# Patient Record
Sex: MALE | Race: WHITE | NOT HISPANIC OR LATINO | Employment: FULL TIME | URBAN - METROPOLITAN AREA
[De-identification: names, ages, dates, MRNs, and addresses within clinical notes are randomized per-mention and may not be internally consistent; named-entity substitution may affect disease eponyms.]

---

## 2017-07-19 ENCOUNTER — TRANSFERRED RECORDS (OUTPATIENT)
Dept: HEALTH INFORMATION MANAGEMENT | Facility: CLINIC | Age: 22
End: 2017-07-19

## 2017-09-13 ENCOUNTER — OFFICE VISIT - HEALTHEAST (OUTPATIENT)
Dept: FAMILY MEDICINE | Facility: CLINIC | Age: 22
End: 2017-09-13

## 2017-09-13 DIAGNOSIS — M54.9 BACK PAIN: ICD-10-CM

## 2017-09-13 ASSESSMENT — MIFFLIN-ST. JEOR: SCORE: 2320.95

## 2017-09-26 ENCOUNTER — OFFICE VISIT - HEALTHEAST (OUTPATIENT)
Dept: FAMILY MEDICINE | Facility: CLINIC | Age: 22
End: 2017-09-26

## 2017-09-26 DIAGNOSIS — F41.9 ANXIETY: ICD-10-CM

## 2017-09-26 ASSESSMENT — MIFFLIN-ST. JEOR: SCORE: 2327.76

## 2017-10-13 ENCOUNTER — COMMUNICATION - HEALTHEAST (OUTPATIENT)
Dept: FAMILY MEDICINE | Facility: CLINIC | Age: 22
End: 2017-10-13

## 2017-11-22 ENCOUNTER — OFFICE VISIT - HEALTHEAST (OUTPATIENT)
Dept: FAMILY MEDICINE | Facility: CLINIC | Age: 22
End: 2017-11-22

## 2017-11-22 DIAGNOSIS — Z78.9 MALE-TO-FEMALE TRANSGENDER PERSON: ICD-10-CM

## 2017-11-22 DIAGNOSIS — F32.A DEPRESSION: ICD-10-CM

## 2017-11-22 ASSESSMENT — MIFFLIN-ST. JEOR: SCORE: 2299.7

## 2017-11-30 ENCOUNTER — COMMUNICATION - HEALTHEAST (OUTPATIENT)
Dept: FAMILY MEDICINE | Facility: CLINIC | Age: 22
End: 2017-11-30

## 2017-12-14 ENCOUNTER — TELEPHONE (OUTPATIENT)
Dept: OTHER | Facility: OUTPATIENT CENTER | Age: 22
End: 2017-12-14

## 2017-12-14 NOTE — TELEPHONE ENCOUNTER
Telephone Intake TG Adult  Date: 2017  Client Name:  Edgard Tran  Preferred Name: Tamika    MRN: [id number]  : 1995      Age:  22  Caller Name: Tamika  Relationship to Client:      Presenting Problem / Reason for Assessment   (Clinical History &Symptoms):     HRT - MTF  Reduce the symptoms of severe dysphoria  Family Support - Feels that is ok - They are working on understanding  Willing to see a Gender Therapist is recommended        Clarify their goals/expected services from  medical care what are they looking for?    Clarify with patient (as necessary) that we are not a primary care clinic and that we do not have a surgeon. We strongly recommend that patients have a primary care doctor for their overall health needs, and we can refer to primary care clinics. We can assist with surgery referrals.  Not currently      Length of time experiencing symptoms:  Since puberty - age 11                                                                                Now has understanding of the feelings they have been having all this time      Seen Other Providers for Gender (if so, where):    M.D/other.(hormones) :  No  --If yes, request records from the provider at the 1st session.    Therapist:  [facility]                      [place]  --if yes, request a referral or summary letter from the therapist at the 1st session..    Psychiatrist:  No  --if yes,, request records from the provider at the 1st session..      Other Medical/Mental Health Diagnoses:  Gender Dysphoria                                                                                ADHD                                                                                Disassociation                                                                                General Anxiety Disorder                                                                                Depression          If needed, clarify if patient calling from group home or  other supervised living arrangement    Note if patient has no mental health or cognitive diagnosis, but phone behavior suggests impairment      Medications:  Sertraline 100 mg      Primary Care Provider: [facility]                    --If multiple medical conditions, request recent records from primary doctor at the 1st session..      Referral Source:  Primary Clinic      Follow Up:        Please Verify Registration    If patient has [employer] Form or [employer], send to .     Prep Welcome Packet and have patient come half hour beforehand to fill out forms in packet (health history form, transgender history, Safety Screening, PHQ9, and JOSE ANTONIO-7.     Paperwork sent 12/15  Appt 1/29/2018

## 2018-01-29 ENCOUNTER — OFFICE VISIT (OUTPATIENT)
Dept: OTHER | Facility: OUTPATIENT CENTER | Age: 23
End: 2018-01-29
Payer: COMMERCIAL

## 2018-01-29 VITALS — WEIGHT: 274 LBS | HEIGHT: 74 IN | BODY MASS INDEX: 35.16 KG/M2

## 2018-01-29 DIAGNOSIS — F33.2 SEVERE EPISODE OF RECURRENT MAJOR DEPRESSIVE DISORDER, WITHOUT PSYCHOTIC FEATURES (H): ICD-10-CM

## 2018-01-29 DIAGNOSIS — F12.10 MARIJUANA ABUSE: ICD-10-CM

## 2018-01-29 DIAGNOSIS — F41.8 OTHER SPECIFIED ANXIETY DISORDERS: ICD-10-CM

## 2018-01-29 DIAGNOSIS — F64.0 GENDER DYSPHORIA IN ADOLESCENT AND ADULT: Primary | ICD-10-CM

## 2018-01-29 ASSESSMENT — ANXIETY QUESTIONNAIRES
3. WORRYING TOO MUCH ABOUT DIFFERENT THINGS: NEARLY EVERY DAY
7. FEELING AFRAID AS IF SOMETHING AWFUL MIGHT HAPPEN: NEARLY EVERY DAY
5. BEING SO RESTLESS THAT IT IS HARD TO SIT STILL: NEARLY EVERY DAY
GAD7 TOTAL SCORE: 20
6. BECOMING EASILY ANNOYED OR IRRITABLE: MORE THAN HALF THE DAYS
2. NOT BEING ABLE TO STOP OR CONTROL WORRYING: NEARLY EVERY DAY
1. FEELING NERVOUS, ANXIOUS, OR ON EDGE: NEARLY EVERY DAY

## 2018-01-29 ASSESSMENT — PATIENT HEALTH QUESTIONNAIRE - PHQ9: 5. POOR APPETITE OR OVEREATING: NEARLY EVERY DAY

## 2018-01-29 NOTE — NURSING NOTE
"Chief Complaint   Patient presents with     Consult     TG       Vitals:    01/29/18 0854   Weight: 124.3 kg (274 lb)   Height: 1.873 m (6' 1.75\")       Body mass index is 35.42 kg/(m^2).      Yi Castellano, [facility]  Sindy Uriostegui  "

## 2018-01-29 NOTE — MR AVS SNAPSHOT
After Visit Summary   1/29/2018    Edgard Tran    MRN: [id number]           Patient Information     Date Of Birth          1995        Visit Information        Provider Department      1/29/2018 9:00 AM [facility]        Today's Diagnoses     Gender dysphoria in adolescent and adult    -  1    Severe episode of recurrent major depressive disorder, without psychotic features (H)        Other specified anxiety disorders        Marijuana abuse           Follow-ups after your visit        Your next 10 appointments already scheduled     Feb 23, 2018  9:00 AM CST   Diagnostic Evaluation with Yvette Myrick PsyD   [facility] ([facility])    [address]  [address] 75[address]  [facility] [address]   [phone]            Mar 14, 2018  9:00 AM CDT   INDIVIDUAL THERAPY with Yvette Myrick PsyD   [facility] ([facility])    [address]  [address]  [facility] [address]   [phone]            Mar 28, 2018  9:00 AM CDT   INDIVIDUAL THERAPY with Yvette Myrick PsyD   [facility] ([facility])    [address]  [address]  [facility] [address]   [phone]              Who to contact     Please call your clinic at [phone] to:    Ask questions about your health    Make or cancel appointments    Discuss your medicines    Learn about your test results    Speak to your doctor            Additional Information About Your Visit        MyChart Information     [employer] is an electronic gateway that provides easy, online access to your medical records. With [employer], you can request a clinic appointment, read your test results, renew a prescription or communicate with your care team.     To sign up for [employer] visit the website at [email or web address]   You will be asked to enter the access code listed below, as well as  "some personal information. Please follow the directions to create your username and password.     Your access code is: [email or web address]  Expires: 2018  1:55 PM     Your access code will  in 90 days. If you need help or a new code, please contact your [facility] or call [phone] for assistance.        Care EveryWhere ID     This is your Care EveryWhere ID. This could be used by other organizations to access your [place] medical records  [id number]        Your Vitals Were     Height BMI (Body Mass Index)                1.873 m (6' 1.75\") 35.42 kg/m2           Blood Pressure from Last 3 Encounters:   No data found for BP    Weight from Last 3 Encounters:   18 124.3 kg (274 lb)              Today, you had the following     No orders found for display       Primary Care Provider    None Specified       No primary provider on file.        Equal Access to Services     [facility]: Hadii elsa Villarreal, wachristiane boles, simón chandraalmadavey ramos, aram waldron . So [facility] [phone].    ATENCIÓN: Si habla español, tiene a wells disposición servicios gratuitos de asistencia lingüística. Llame al [phone].    We comply with applicable federal civil rights laws and [place] laws. We do not discriminate on the basis of race, color, national origin, age, disability, sex, sexual orientation, or gender identity.            Thank you!     Thank you for choosing [facility]  for your care. Our goal is always to provide you with excellent care. Hearing back from our patients is one way we can continue to improve our services. Please take a few minutes to complete the written survey that you may receive in the mail after your visit with us. Thank you!             Your Updated Medication List - Protect others around you: Learn how to safely use, store and throw away your medicines at www.disposemymeds.org.          This list is " accurate as of 1/29/18 11:59 PM.  Always use your most recent med list.                   Brand Name Dispense Instructions for use Diagnosis    SERTRALINE HCL PO      Take 150 mg by mouth daily

## 2018-01-29 NOTE — PROGRESS NOTES
" Transgender Diagnostic and Assessment    Preferred name: Tamika  Preferred pronouns: she/her  Sex assigned at birth:male  Gender identity: trans woman        PAST GENDER THERAPY: none  PAST HORMONE USE: none  GENDER RELATED SURGERIES: none    INTRODUCTION and GOALS    22 year old trans woman presenting to feminize body    GENDER DEVELOPMENT  Patient states she put a name to her gender issues just less than 6 months ago.  As a child she noticed she was attracted to girls clothes, but has a sense of guilt and never did anything with this attraction. She recalls stealing her mother's lipstick at around age 6-7 and recalls her mother yelling at her. Around the age of sixth grade, she states was \"alienated\" from her body and was also alienated from social interaction; felt she could not relate to people, although she could perhaps more toward girls. She received social messages that she was supposed to hang out with boys; she would go through periods of wearing her sister's and mother's clothes in private-both underwear and outerwear. She did grow her hair at shoulder length before her mother made her cut it. Her father was present in her life during middle school, but was hands off in terms of how she dressed or chose to present herself to the world; would make fun of her for her long hair. Teachers were accepting of her dress and mannerisms.   Patient self-describes her symptoms during this time as dissociative. In high school she describes having a lot of confusion, and struggled academically both in middle school and high school. She tried to fit in socially and experienced further alienation.  She states she started smoking marijuana at age 15, stopped caring about what other people thought, and started explore her sexuality.  Specifically, began accepting and describing her sexuality as queer, and came out as queer to her mother. Mother then told patient to pick one or the other: donaldson or straight. Pt chose to label " "herself as donaldson and then felt shame at being attracted to women in addition to men. Pt saw a several general therapists, spoke to them about her sexuality, specifically; in recollection states did not know trans people outside of jokes and pornography.  She never talked about it at this point.  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 Two years ago met a transwoman as a friend and she talked to the patient about it; patient started recognizing transgender issues in herself but rejected them.  She began consuming significant amounts of marijuana and LSD, became heavily depressed and \"disassociative.\" Just this past summer around 08/2017, she read an article on the Internet about gender dysphoria.  She became very emotional, and had an inspirational or exploratory moment; her dysphoria ramped up and she felt she had to accept things as they are. She presented here for care instead of continuing to smoke marijuana.           BODY,  ANATOMIC CONCERNS  She describes puberty as starting at around age 12-13; describes herself as feeling deadened emotionally; she would become silent and stare off into space.  Prior to this, she was a hyperactive, talkative child.  Currently, experiences most distress about her height, shoulders, feet and hips.         DISCLOSURE and RELATIONSHIP, SOCIAL IMPACTS  She currently lives with her parents, who are together. Mother knows about her gender and plans for medical transition. She reacted negatively up until just last week when she went to a therapy session with the patient. She is now more accepting.  Father also knows about patient's gender and plans for medical transition, and is described by pt as worrying about her gender and transition plans.  Other siblings live in [place] and are supportive, and online friends are supportive.  She has some physical friends who are " "supportive.  She works full-time, is not out at work and has no plans to come out at work.         CURRENT SOCIAL, LEGAL OR PHYSICAL TRANSITIONS    She has not legally transitioned. She has come out to a few physical friends and online friends.  She dresses in feminine attire at home, but not in public.  She uses feminine name and pronouns at home and with friends.       CURRENT SOCIAL SUPPORTS, ECONOMIC AND HEALTH CARE CONCERNS  She relies on her friends for emotional support, her parents for logistical support such as insurance and housing.  She describes her transportation and financial situation as both stable and available.  She does have a primary care provider.         TRANSGENDER SUPPORTS/ TRANSPHOBIA  She has met one transwoman but currently does not have any other transpeople in her life, face-to-face. But she does interact online.  She is familiar with local trans resources, but has not accessed them.         PSYCHIATRIC HISTORY  Pt has an extensive psychiatric history. She was diagnosed with depression at age 11 and also with generalized anxiety disorder.  She has a history of a bipolar diagnosis, but only saw that provider for about 10 minutes.  She describes having symptoms of disassociation in the past, but no formal diagnosis of a dissociative disorder. In 2014 she was hospitalized at [facility] due to a sense of \"disconnection from reality,\" and self-harming behavior.  She was cutting her arms.  She has a history of self-harm which started in 9th grade-mainly cutting on the left arm.  This was more prevalent when she was younger but the behavior continues, with last episode 4 months ago.  Currently, she has just started on Zoloft in 10/2017, under the care of her primary care physician.  She was last seen 1 month ago. She last saw a psychiatrist over 4 years ago; past medications include Concerta for ADHD, Wellbutrin and \"a pill for bipolar disorder.\"      SOCIAL HISTORY:  Alcohol use once a week, 1 " "drink per sitting.  However, she has daily use of marijuana 1-2 times a day.  This is reduced from high school when she used 4-6 times a day.  She continues to use marijuana to control her depression, mood swings.  She has no history of other substance use or eating disorders.  She describes obsessive thoughts and intrusive visualizations.  Cage questionnaire is 4/4; PHQ 9 is 26, with positive suicidal ideation without plan.  [place] 7 is 19. Patient describes not wanting to kill herself but having a general sense of wanting to die on a regular basis.       PE  Height 1.873 m (6' 1.75\"), weight 124.3 kg (274 lb).        Mental Status Assessment:  Appearance:  Adequately groomed; significant marijuana smell surrounds patient  Behavior/relationship to examiner/demeanor:  Cooperative and Reduced eye contact  Orientation: Oriented to person, place and time  Speech Rate:  Normal  Speech Spontaneity:  Normal  Mood:  dysphoric and anxious  Affect:  Restricted and Anxious/Nervous  Thought Process (Associations):  Logical, Linear and Goal directed  Thought Content:  patient does not appear to be responding to internal stimuli, Suicidal ideation, denies suicidal intent or plan and Obsessions  Abnormal Perception:  None  Attention/Concentration:  Fair  Language:  Intact  Insight:  Fair  Judgment:  Fair    Motor activity/EPS:  Agitation, mild    A/P  1. Gender dysphoria  2. Major depression, severe  3. Anxiety disorder  4. Marijuana use     Counseled patient on the following issues:      Patient meets the guideline criteria for gender dysphoria as outlined in DSM-5 and WPATH SOC 7, and may benefit from from hormone therapy.     They have do not appear to have stable understanding of their gender identity and interventions for gender dysphoria, as have only been consciously exploring gender since 8/2017.   --Prior to any hormone therapy, would require patient to establish care with a gender therapist to further explore gender,  " navigating social interactions, and the range of medical and non medical interventions for dysphoria    As per WPATH SOC 7 guidelines, mental health issues need to be reasonably well controlled.  Tamika has significant uncontrolled mental health issues.   --Prior to any hormone therapy., would require reasonable control (at least partial remission) of psychiatric conditions. Pt to see primary care provider for this to start, but may require psychiatrist. Reduce marijuana use to 1-2x/week, discussed not using to self-medicate    They have partially engaged in  appropriate social transitions and any associated disclosures of their gender status, reducing risk of psychosocial harms. Tamika is dependent on parents for financial, insurance and housing and is unclear how supportive parents are of medical transition. She is not out at work and should work with gender therapist to develop a plan to address this issue as well    They have identified an emotional and logistical support system to assist them with challenges commonly associated with medical and social transition.    They have some  knowledge of transgender peer/community resources and have transgender peer support.     There are no significant financial, insurance, transportation or housing barriers to safe, effective hormone therapy at this time, except as these are largely  based on parental support     Patient to schedule medical evauation and informed consent appointment when the above issues are addressed.     I spent a total of 50 minutes face-to-face with Edgard Tran during today's office visit.  Over 50% of this time was spent counseling the patient and/or coordinating care regarding aove.  See note for details.

## 2018-01-30 ASSESSMENT — ANXIETY QUESTIONNAIRES: GAD7 TOTAL SCORE: 20

## 2018-01-30 ASSESSMENT — PATIENT HEALTH QUESTIONNAIRE - PHQ9: SUM OF ALL RESPONSES TO PHQ QUESTIONS 1-9: 26

## 2018-02-09 PROBLEM — F41.1 GENERALIZED ANXIETY DISORDER: Status: ACTIVE | Noted: 2018-02-09

## 2018-02-09 PROBLEM — F41.8 OTHER SPECIFIED ANXIETY DISORDERS: Status: ACTIVE | Noted: 2018-02-09

## 2018-02-09 PROBLEM — F12.10 MARIJUANA ABUSE: Status: ACTIVE | Noted: 2018-02-09

## 2018-02-09 PROBLEM — F64.0 GENDER DYSPHORIA IN ADOLESCENT AND ADULT: Status: ACTIVE | Noted: 2018-02-09

## 2018-02-09 PROBLEM — F32.9 MAJOR DEPRESSIVE DISORDER WITHOUT PSYCHOTIC FEATURES: Status: ACTIVE | Noted: 2018-02-09

## 2018-02-23 ENCOUNTER — OFFICE VISIT (OUTPATIENT)
Dept: OTHER | Facility: OUTPATIENT CENTER | Age: 23
End: 2018-02-23
Payer: COMMERCIAL

## 2018-02-23 DIAGNOSIS — F64.0 GENDER DYSPHORIA IN ADOLESCENT AND ADULT: Primary | ICD-10-CM

## 2018-02-23 DIAGNOSIS — F33.1 MAJOR DEPRESSIVE DISORDER, RECURRENT EPISODE, MODERATE (H): ICD-10-CM

## 2018-02-23 DIAGNOSIS — F41.1 GAD (GENERALIZED ANXIETY DISORDER): ICD-10-CM

## 2018-02-23 ASSESSMENT — PATIENT HEALTH QUESTIONNAIRE - PHQ9: 5. POOR APPETITE OR OVEREATING: NEARLY EVERY DAY

## 2018-02-23 ASSESSMENT — ANXIETY QUESTIONNAIRES
2. NOT BEING ABLE TO STOP OR CONTROL WORRYING: NEARLY EVERY DAY
3. WORRYING TOO MUCH ABOUT DIFFERENT THINGS: NEARLY EVERY DAY
5. BEING SO RESTLESS THAT IT IS HARD TO SIT STILL: NEARLY EVERY DAY
1. FEELING NERVOUS, ANXIOUS, OR ON EDGE: NEARLY EVERY DAY
GAD7 TOTAL SCORE: 19
6. BECOMING EASILY ANNOYED OR IRRITABLE: SEVERAL DAYS
7. FEELING AFRAID AS IF SOMETHING AWFUL MIGHT HAPPEN: NEARLY EVERY DAY

## 2018-02-23 NOTE — MR AVS SNAPSHOT
After Visit Summary   2018    Edgard Tran    MRN: [id number]           Patient Information     Date Of Birth          1995        Visit Information        Provider Department      2018 9:00 AM Yvette Myrick PsyD [facility]        Today's Diagnoses     Gender dysphoria in adolescent and adult    -  1    Major depressive disorder, recurrent episode, moderate (H)        JOSE ANTONIO (generalized anxiety disorder)           Follow-ups after your visit        Your next 10 appointments already scheduled     Mar 14, 2018  9:00 AM CDT   Individual Therapy 53+ minutes with Yvette Myrick PsyD   [facility] ([facility])    [address]  [address] 7521  [facility] [address]   [phone]            Mar 28, 2018  9:00 AM CDT   Individual Therapy 53+ minutes with Yvette Myrick PsyD   [facility] ([facility])    [address]  [address]  [facility] [address]   [phone]              Who to contact     Please call your clinic at [phone] to:    Ask questions about your health    Make or cancel appointments    Discuss your medicines    Learn about your test results    Speak to your doctor            Additional Information About Your Visit        MyChart Information     [employer] is an electronic gateway that provides easy, online access to your medical records. With [employer], you can request a clinic appointment, read your test results, renew a prescription or communicate with your care team.     To sign up for [employer] visit the website at [email or web address]   You will be asked to enter the access code listed below, as well as some personal information. Please follow the directions to create your username and password.     Your access code is: [email or web address]  Expires: 2018  1:55 PM     Your access code will  in 90 days. If you need help or a new code, please  contact your [facility] or call [phone] for assistance.        Care EveryWhere ID     This is your Care EveryWhere ID. This could be used by other organizations to access your [place] medical records  [id number]         Blood Pressure from Last 3 Encounters:   No data found for BP    Weight from Last 3 Encounters:   01/29/18 124.3 kg (274 lb)              We Performed the Following     Diagnostic Assessment (complete) [29208]        Primary Care Provider    None Specified       No primary provider on file.        Equal Access to Services     AVELINO LAWSON : Hadii aad ku hadasho Soomaali, waaxda luqadaha, qaybta kaalmada adeegyada, waxay idiin hayaan adeeg chery waldron . So [facility] [phone].    ATENCIÓN: Si habla español, tiene a wells disposición servicios gratuitos de asistencia lingüística. [facility] [phone].    We comply with applicable federal civil rights laws and [place] laws. We do not discriminate on the basis of race, color, national origin, age, disability, sex, sexual orientation, or gender identity.            Thank you!     Thank you for choosing [facility]  for your care. Our goal is always to provide you with excellent care. Hearing back from our patients is one way we can continue to improve our services. Please take a few minutes to complete the written survey that you may receive in the mail after your visit with us. Thank you!             Your Updated Medication List - Protect others around you: Learn how to safely use, store and throw away your medicines at www.disposemymeds.org.          This list is accurate as of 2/23/18 11:59 PM.  Always use your most recent med list.                   Brand Name Dispense Instructions for use Diagnosis    SERTRALINE HCL PO      Take 150 mg by mouth daily

## 2018-02-23 NOTE — PROGRESS NOTES
[facility]  Program in Human Sexuality  Department of Family Medicine & Community Health  [employer] [facility] [employer]  [address]  [place] [address]  Phone: [phone]  Fax: [phone]  [email or web address]    Transgender Diagnostic (TG) Diagnostic Assessment Interview  It is not required that you ask all questions or problems. Prioritize and set the most clinically relevant information in the time available.    Date of Service: 2/23/18  Client Name: Edgard Tran  YOB: 1995  22 year old  MRN:  [id number]  Gender/Gender Identity: transgender woman  Treating Provider:   Program: TG  Type of Session: Assessment  Present in Session: client  Number of Minutes:  55         Ethnicity:    _X   __   __Latino/  __Native American        __Asian American  __ Multi-racial: __ Other (Describe): _______________________________________________________________    Any relevant cultural issues? (Minority stress, immigration history, level of acculturation, social orientation, time orientation, verbal communication style, spiritual beliefs, etc.) _____________________________________________ ________________________________________________________________________________________          Instructions for therapists: X Introduce yourself; X Description of process; X Testing and checklist packets (for couple give each an appropriate packet); X Review client confidentiality    Individuals present at session (other than patient): none    Please Note: Client identified name  Tamika  and stated preference for she/her/hers pronouns. Therefore, she/her/hers pronouns will be used throughout this document when referring to client.    Referral Source: Primary care doctor Dr. Uriostegui at [facility].    Chief Complaint/Presenting Problem and Goals:  Client reported a long-standing gender dysphoria since adolescence. Client noted goal of  getting support for HRT; noting that she saw Wesley Mg MD for a consultation and Dr. Mg referred client to therapy.    _________________________________________________________________________  Transgender Health Services  Program-Specific Information    History of gender dysphoria   Client reported that she first discovered the transgender community and the idea of gender diversity at age 18 through social media. Client noted reading an article about gender dysphoria and dissociation; noting recognizing her own dissociation. Client recalled a significant shift in her personality and mood as an adolescent. Client stated that as a child, she was  loud  and outgoing, which shifted to socially isolating, internalizing, and dissociating from her body in adolescence. Client described dissociating as a feeling of  dreaming when I am awake.  Client noted retrospectively recognizing that this shift was the result of unintentionally suppressing her gender identity as female.    Client noted that as a child (age 5) she explored her mother s make-up and dresses, which she was reprimanded for. Client noted that once in a while following her initial efforts, she would dress in private and feel  intense shame.  Client described reading and video games as her escape. Client noted that she was  constantly  reading from the time she could read (age 4-5). Client noted that she was reading at college level at age 7.     Client reported that she disclosed her gender identity to one close friend and to her therapist. Client reported that her parents have not been supportive of her desire to transition.    Body Image/Anatomical dysphoria:  Client noted that she  hates  her body. Client noted body dysphoria in reaction to her body shape, voice, shoulders, facial/body hair, and chest. Client reported that at the time of puberty, she did not recognize the source of distress. Looking back, she recognizes her emotional and social  deterioration at the onset of puberty as an indicator of her gender dysphoria.    Social Supports:   Client reported that she has few friends and minimal social support. Client shared history of struggles establishing and maintaining friendships.    Internalized transphobia:  Client noted struggles to transition fully for fear of social rejection.    Relevant Sexuality Information:  Client reported history of one romantic relationship that lasted for 1 year.    _________________________________________________________________________      Mental Health History   Current Symptoms: Client reported moderate-severe depression symptoms and severe anxiety (see PHQ-9 and JOSE ANTONIO-7).    Therapist:  (past 4 years) [facility], [place]; depression, gender identity, social issues    Hospitalization: (2015) [facility] - hospitalized for  self-harm  and safety concerns.        Other Medical/Mental Health Diagnoses:  Gender Dysphoria, ADHD, Disassociation, General Anxiety Disorder, Depression          Medications:  Sertraline 100 mg    Prescriber: RINA [facility]    Substance Use: Client reported consuming 1 alcoholic beverage per week on average. Client denied additional substance use.    Medical History     Primary Care Provider: [facility]                              Current Health: Overweight  General medications: none  Major accidents/illnesses/surgeries:  none        Medications: none     Prescribing Physician: none     Diagnosis (if known): none      Relationships/Social History:  Client reported a long standing history of struggles establishing meaningful relationships and social isolation. Client reported one long-term (1 year) romantic relationship with a cisgender female.    Family History  Client was born and raised in [place] to an intact family. Client is the younger of 2 children (older sister age 27).Client described her relationship with her parents as  good but distant.  Client  described her relationship with her brother as  good.  Client denied history of abuse. Client currently lives with her parents in their family home in [place], [place].      Educational History Client earned his high school diploma from [employer].    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 Client has been in current position for 4 years.     Legal Issues: none reported    _________________________________________________________________________     CONCLUSIONS      Mental Status   Appearance:  No apparent distress  Behavior/relationship to examiner/demeanor:  Cooperative and Resistant  Orientation: Oriented to person, place, time and situation  Speech Rate:  Normal  Speech Spontaneity:  Normal  Mood:  dysphoric and anxious  Affect:  Tearful and Restricted  Thought Process (Associations):  Logical  Thought Content:  no evidence of suicidal or homicidal ideation  Abnormal Perception:  None  Attention/Concentration:  Normal  Language:  Intact  Insight:  Adequate  Judgment:  Fair      DSM-5 Diagnosis:  302.85 Gender Dysphoria in Adult  296.32 Major Depressive Disorder, Recurrent, moderate  300.02 Generalized Anxiety Disorder  R/O Autism      Conclusions/Recommendations/Initial Treatment Goals:   Client is a , 22 year old person assigned male at birth who is questioning their gender identity. They reported a history of gender non-conforming behavior and interests that emerged in childhood. Client described experiencing depression and anxiety symptoms in reaction to repression and suppression of their gender identity. Based on client s history and account of symptoms, client meets the criteria for gender dysphoria which includes cross-gender identification, anatomical dysphoria, notable discomfort with birth assigned sex and the cultural expectations of the traditional male gender role. Client reported seeking  psychotherapy services at this time for support of social and medical gender transition. Complicating her situation are: 1) current struggles with severe depression and anxiety; and 2) waivering family support regarding her gender identity.  Given client s presenting concerns and goals for therapy, the following recommendations are offered:  1) Continue individual therapy with a provider specialized in gender and sexuality. Therapy should focus exploring the role of client s gender nonconformity, body dysphoria, sexual preferences, and social pattern in client s emotional and relational functioning. Therapy should also serve as a safe place for client to further explore her gender and sexual identity.  2) Continue to assess client s mental health and emotional well-being, as mental health stability will be an important prior to taking steps towards medical interventions, as recommended by the WPATH Standards of Care. Therapy should focus on exploring client s expectations for potential medical interventions as they relate to reality.   3) Consider participation in transgender support group in order to develop increased social support regarding transition goals.   4) Client reported long-standing social struggles, hyperfocused interests beginning in childhood, accelerated reading ability and comprehension (splitter skills), and preference for fantasy based social interaction to face-to-face, and history of ADHD. Given the high co-occurrence of neurodiversity (i.e., Autism Spectrum Disorder (ASD) and Attention Deficit Hyperactivity Disorder (ADHD)) and gender identity related concerns, coupled with client s presenting social and behavioral patterns, it is recommended that client undergo a more specialized assessment aimed at assessing client for potential Autism Spectrum Disorder. Increased understanding of client s neurofunctioning could provide a greater sense of empowerment for client and more individualized  approach to care by each of client s providers.  5) Continue psychiatric intervention through primary care doctor for stability of mood and/or consider psychiatric consultation with a mental health practitioner, as more specialized care could provide client with the best options of intervention.  Yvette Myrick PsyD, LP

## 2018-03-08 PROBLEM — F33.1 MAJOR DEPRESSIVE DISORDER, RECURRENT EPISODE, MODERATE (H): Status: ACTIVE | Noted: 2018-03-08

## 2018-03-14 ENCOUNTER — TELEPHONE (OUTPATIENT)
Dept: OTHER | Facility: OUTPATIENT CENTER | Age: 23
End: 2018-03-14

## 2018-03-14 ENCOUNTER — OFFICE VISIT (OUTPATIENT)
Dept: OTHER | Facility: OUTPATIENT CENTER | Age: 23
End: 2018-03-14
Payer: COMMERCIAL

## 2018-03-14 DIAGNOSIS — F41.1 GAD (GENERALIZED ANXIETY DISORDER): ICD-10-CM

## 2018-03-14 DIAGNOSIS — F64.0 GENDER DYSPHORIA IN ADOLESCENT AND ADULT: Primary | ICD-10-CM

## 2018-03-14 DIAGNOSIS — F33.1 MAJOR DEPRESSIVE DISORDER, RECURRENT EPISODE, MODERATE (H): ICD-10-CM

## 2018-03-14 ASSESSMENT — ANXIETY QUESTIONNAIRES: GAD7 TOTAL SCORE: 19

## 2018-03-14 ASSESSMENT — PATIENT HEALTH QUESTIONNAIRE - PHQ9: SUM OF ALL RESPONSES TO PHQ QUESTIONS 1-9: 21

## 2018-03-14 NOTE — PROGRESS NOTES
[facility] -  Case Progress Note    3/14/18  Client Name: Edgard Lundberg she/her/hers  YOB: 1995  MRN:  [id number]  Treating Provider: Yvette Myrick PsyD  Type of Session: Individual  Present in Session: client  Number of Minutes:  55  Treatment Plan Due: 3/14/2019    Current Symptoms/Status:  Distress in reaction to incongruence between birth assigned sex and gender identity.     Moderate anxiety and depression. Thoughts of death and dying, passive suicidal ideation (no plan or intention)    long-standing social struggles, hyperfocused interests beginning in childhood, accelerated reading ability and comprehension (splitter skills), and preference for fantasy based social interaction to face-to-face, and history of ADHD    Progress Toward Treatment Goals:   Reported increased social engagment    Intervention & Response:  Supportive and expressive approach to exploring and identifying goals for treatment and treatment planning, as well as discussing diagnosis and subsequent recommendations. See scanned and attached treatment plan. Client was actively engaged in the treatment planning process as noted in his identified goals for treatment. Client reported agreement with diagnosis and subsequent recommendations for treatment.    Assignment:  Explore jo ann group at [facility]    Interactive Complexity:  None.    Diagnosis:  302.85 Gender Dysphoria in Adult  296.32 Major Depressive Disorder, Recurrent, moderate  300.02 Generalized Anxiety Disorder  R/O Autism    Plan / Need for Future Services:  Return for therapy in 2 weeks.      Yvette Myrick PsyD

## 2018-03-14 NOTE — MR AVS SNAPSHOT
After Visit Summary   3/14/2018    Edgard Tran    MRN: [id number]           Patient Information     Date Of Birth          1995        Visit Information        Provider Department      3/14/2018 9:00 AM Yvette Myrick PsyD [facility]        Today's Diagnoses     Gender dysphoria in adolescent and adult    -  1    Major depressive disorder, recurrent episode, moderate (H)        JOSE ANTONIO (generalized anxiety disorder)           Follow-ups after your visit        Your next 10 appointments already scheduled     Mar 28, 2018  9:00 AM CDT   Individual Therapy 53+ minutes with Yvette Myrick PsyD   [facility] ([facility])    [address]  Mail [address] 7521  [facility] [address]   [phone]              Who to contact     Please call your clinic at [phone] to:    Ask questions about your health    Make or cancel appointments    Discuss your medicines    Learn about your test results    Speak to your doctor            Additional Information About Your Visit        MyChart Information     [employer] is an electronic gateway that provides easy, online access to your medical records. With [employer], you can request a clinic appointment, read your test results, renew a prescription or communicate with your care team.     To sign up for [employer] visit the website at [email or web address]   You will be asked to enter the access code listed below, as well as some personal information. Please follow the directions to create your username and password.     Your access code is: [email or web address]  Expires: 2018  2:55 PM     Your access code will  in 90 days. If you need help or a new code, please contact your [facility] or call [phone] for assistance.        Care EveryWhere ID     This is your Care EveryWhere ID. This could be used by other organizations to access your [facility]  records  [id number]         Blood Pressure from Last 3 Encounters:   No data found for BP    Weight from Last 3 Encounters:   01/29/18 124.3 kg (274 lb)              We Performed the Following     Individual Psychotherapy (53+ min) [25244]     Mental Health Tx Plan Scan (HIM Scan)        Primary Care Provider    None Specified       No primary provider on file.        Equal Access to Services     [facility]: Hadii aad ku hadasho Soomaali, waaxda luqadaha, qaybta kaalmada lottieda, aram marshallkalashaneka waldron . So [facility] [phone].    ATENCIÓN: Si habla español, tiene a wells disposición servicios gratuitos de asistencia lingüística. Llame al [phone].    We comply with applicable federal civil rights laws and [place] laws. We do not discriminate on the basis of race, color, national origin, age, disability, sex, sexual orientation, or gender identity.            Thank you!     Thank you for choosing [facility]  for your care. Our goal is always to provide you with excellent care. Hearing back from our patients is one way we can continue to improve our services. Please take a few minutes to complete the written survey that you may receive in the mail after your visit with us. Thank you!             Your Updated Medication List - Protect others around you: Learn how to safely use, store and throw away your medicines at www.disposemymeds.org.          This list is accurate as of 3/14/18 10:00 AM.  Always use your most recent med list.                   Brand Name Dispense Instructions for use Diagnosis    SERTRALINE HCL PO      Take 150 mg by mouth daily

## 2018-03-21 ENCOUNTER — OFFICE VISIT - HEALTHEAST (OUTPATIENT)
Dept: FAMILY MEDICINE | Facility: CLINIC | Age: 23
End: 2018-03-21

## 2018-03-21 DIAGNOSIS — F98.8 ATTENTION DEFICIT DISORDER (ADD) WITHOUT HYPERACTIVITY: ICD-10-CM

## 2018-03-21 DIAGNOSIS — F32.A DEPRESSION: ICD-10-CM

## 2018-03-21 ASSESSMENT — MIFFLIN-ST. JEOR: SCORE: 2317.56

## 2018-03-28 ENCOUNTER — OFFICE VISIT (OUTPATIENT)
Dept: OTHER | Facility: OUTPATIENT CENTER | Age: 23
End: 2018-03-28
Payer: COMMERCIAL

## 2018-03-28 DIAGNOSIS — F41.1 GAD (GENERALIZED ANXIETY DISORDER): ICD-10-CM

## 2018-03-28 DIAGNOSIS — F64.0 GENDER DYSPHORIA IN ADOLESCENT AND ADULT: Primary | ICD-10-CM

## 2018-03-28 DIAGNOSIS — F33.1 MAJOR DEPRESSIVE DISORDER, RECURRENT EPISODE, MODERATE (H): ICD-10-CM

## 2018-03-28 NOTE — MR AVS SNAPSHOT
After Visit Summary   3/28/2018    Edgard Lopez    MRN: [id number]           Patient Information     Date Of Birth          1995        Visit Information        Provider Department      3/28/2018 9:00 AM Yvette Myrick PsyD [facility]        Today's Diagnoses     Gender dysphoria in adolescent and adult    -  1    Major depressive disorder, recurrent episode, moderate (H)        JOSE ANTONIO (generalized anxiety disorder)           Follow-ups after your visit        Your next 10 appointments already scheduled     Apr 18, 2018  9:00 AM CDT   Individual Therapy 53+ minutes with Yvette Myrick PsyD   [facility] ([facility]    [address]  Mail Code 7521  [facility] [address]   [phone]            May 04, 2018  2:00 PM CDT   Individual Therapy 53+ minutes with Yvette Myrick PsyD   [facility] ([facility] hospital)    [address]  Mail [address] 7521  [facility] [address]   [phone]            May 16, 2018  9:00 AM CDT   Individual Therapy 53+ minutes with Yvette Myrick PsyD   [facility] ([facility])    [address]  Mail [address] 7521  [facility] [address]   [phone]              Who to contact     Please call your clinic at [phone] to:    Ask questions about your health    Make or cancel appointments    Discuss your medicines    Learn about your test results    Speak to your doctor            Additional Information About Your Visit        MyChart Information     [employer] is an electronic gateway that provides easy, online access to your medical records. With [employer], you can request a clinic appointment, read your test results, renew a prescription or communicate with your care team.     To sign up for [employer] visit the website at [email or web address]   You will be asked to enter the access code listed below, as well as some  personal information. Please follow the directions to create your username and password.     Your access code is: [email or web address]  Expires: 2018  2:55 PM     Your access code will  in 90 days. If you need help or a new code, please contact your [facility] or call [phone] for assistance.        Care EveryWhere ID     This is your Care EveryWhere ID. This could be used by other organizations to access your [facility] medical records  [id number]         Blood Pressure from Last 3 Encounters:   No data found for BP    Weight from Last 3 Encounters:   18 124.3 kg (274 lb)              We Performed the Following     Individual Psychotherapy (53+ min) [06145]        Primary Care Provider    None Specified       No primary provider on file.        Equal Access to Services     [facility] : Dorina Villarreal, wachristiane aldanaqadaha, qaybta kaalmada richard, aram waldron . So [facility] [phone].    ATENCIÓN: Si habla español, tiene a wells disposición servicios gratuitos de asistencia lingüística. Llame al [phone].    We comply with applicable federal civil rights laws and [place] laws. We do not discriminate on the basis of race, color, national origin, age, disability, sex, sexual orientation, or gender identity.            Thank you!     Thank you for choosing [facility]  for your care. Our goal is always to provide you with excellent care. Hearing back from our patients is one way we can continue to improve our services. Please take a few minutes to complete the written survey that you may receive in the mail after your visit with us. Thank you!             Your Updated Medication List - Protect others around you: Learn how to safely use, store and throw away your medicines at www.disposemymeds.org.          This list is accurate as of 3/28/18 10:00 AM.  Always use your most recent med list.                   Brand Name  Dispense Instructions for use Diagnosis    SERTRALINE HCL PO      Take 150 mg by mouth daily

## 2018-03-28 NOTE — PROGRESS NOTES
[facility] -  Case Progress Note    3/28/18  Client Name: Edgard Lundberg she/her/hers  YOB: 1995  MRN:  [id number]  Treating Provider: Yvette Myrick PsyD  Type of Session: Individual  Present in Session: client  Number of Minutes:  55  Treatment Plan Due: 3/14/2019    Current Symptoms/Status:  Distress in reaction to incongruence between birth assigned sex and gender identity.     Moderate anxiety and depression. Thoughts of death and dying, passive suicidal ideation (no plan or intention)    long-standing social struggles, hyperfocused interests beginning in childhood, accelerated reading ability and comprehension (splitter skills), and preference for fantasy based social interaction to face-to-face, and history of ADHD    Progress Toward Treatment Goals:   Reported increased social engagment    Intervention & Response:  Expressive and CBT approach to exploring gender and goals around gender identity. Explored client's goals around gender identity and expression through interactive activity focused on components of sexual identity. Took time to explore client's understanding of birth assigned sex, gender expression, gender identity, and attraction. Client was able to differentiate between the components and identify where they fall on the masculine/feminine spectrum for each. Further explored gender expression and gender identity components by asking client to identify where they would like to be in comparison to where they are currently. Client identified a desire to move further along the feminine spectrum. Took time to explore what the client perceived as needing to change in order to achieve goals; noting gaining comfort around expressing gender preferences, voice pitch raise, more feminine dress, and exploring options for medical transition. Client was actively engaged in session as noted in his openness to exploring gender.    Important supports: Artemio  Nirali Quezada    Assignment:  Explore joa nn group at [facility]    Interactive Complexity:  None.    Diagnosis:  302.85 Gender Dysphoria in Adult  296.32 Major Depressive Disorder, Recurrent, moderate  300.02 Generalized Anxiety Disorder  R/O Autism    Plan / Need for Future Services:  Return for therapy in 2 weeks.      Yvette Myrick PsyD

## 2018-04-24 ENCOUNTER — OFFICE VISIT (OUTPATIENT)
Dept: OTHER | Facility: OUTPATIENT CENTER | Age: 23
End: 2018-04-24
Payer: COMMERCIAL

## 2018-04-24 DIAGNOSIS — F64.0 GENDER DYSPHORIA IN ADOLESCENT AND ADULT: Primary | ICD-10-CM

## 2018-04-24 NOTE — MR AVS SNAPSHOT
After Visit Summary   4/24/2018    Edgard Lopez    MRN: [id number]           Patient Information     Date Of Birth          1995        Visit Information        Provider Department      4/24/2018 9:00 AM Yvette Myrick PsyD [facility]        Today's Diagnoses     Gender dysphoria in adolescent and adult    -  1       Follow-ups after your visit        Your next 10 appointments already scheduled     May 04, 2018  2:00 PM CDT   Individual Therapy 53+ minutes with Yvette Myrick PsyD   [facility] ([facility])    [address]  [address] 75[address]  [facility] [address]   [phone]            May 14, 2018 10:40 AM CDT   Return Visit with Wesley Mg MD   [facility] ([facility])    [address]  [address]  [facility] [address]   [phone]            May 16, 2018  9:00 AM CDT   Individual Therapy 53+ minutes with Yvette Myrick PsyD   [facility] ([facility])    [address]  [address]  [facility] [address]   [phone]              Who to contact     Please call your clinic at [phone] to:    Ask questions about your health    Make or cancel appointments    Discuss your medicines    Learn about your test results    Speak to your doctor            Additional Information About Your Visit        MyChart Information     [employer] is an electronic gateway that provides easy, online access to your medical records. With [employer], you can request a clinic appointment, read your test results, renew a prescription or communicate with your care team.     To sign up for Sign[employer]t visit the website at [email or web address]   You will be asked to enter the access code listed below, as well as some personal information. Please follow the directions to create your username and password.     Your access code is: [email or web address]  Expires:  2018  2:55 PM     Your access code will  in 90 days. If you need help or a new code, please contact your [facility] or call [phone] for assistance.        Care EveryWhere ID     This is your Care EveryWhere ID. This could be used by other organizations to access your [place] medical records  [id number]         Blood Pressure from Last 3 Encounters:   No data found for BP    Weight from Last 3 Encounters:   18 124.3 kg (274 lb)              We Performed the Following     Individual Psychotherapy (53+ min) [81355]        Primary Care Provider    None Specified       No primary provider on file.        Equal Access to Services     [facility]: Hadii elsa Villarreal, kathy boles, simón chandraalmadavey ramos, aram waldron . So [facility] [place] [phone].    ATENCIÓN: Si habla español, tiene a wells disposición servicios gratuitos de asistencia lingüística. Llame al [phone].    We comply with applicable federal civil rights laws and [place] laws. We do not discriminate on the basis of race, color, national origin, age, disability, sex, sexual orientation, or gender identity.            Thank you!     Thank you for choosing [facility]  for your care. Our goal is always to provide you with excellent care. Hearing back from our patients is one way we can continue to improve our services. Please take a few minutes to complete the written survey that you may receive in the mail after your visit with us. Thank you!             Your Updated Medication List - Protect others around you: Learn how to safely use, store and throw away your medicines at www.disposemymeds.org.          This list is accurate as of 18 11:22 AM.  Always use your most recent med list.                   Brand Name Dispense Instructions for use Diagnosis    SERTRALINE HCL PO      Take 150 mg by mouth daily

## 2018-04-24 NOTE — PROGRESS NOTES
[facility] -  Case Progress Note    4/24/18  Client Name: Edgard Lundberg she/her/hers  YOB: 1995  MRN:  [id number]  Treating Provider: Yvette Myrick PsyD  Type of Session: Individual  Present in Session: client  Number of Minutes:  55  Treatment Plan Due: 3/14/2019    Current Symptoms/Status:  Distress in reaction to incongruence between birth assigned sex and gender identity.     Moderate anxiety and depression. Thoughts of death and dying, passive suicidal ideation (no plan or intention)    long-standing social struggles, hyperfocused interests beginning in childhood, accelerated reading ability and comprehension (splitter skills), and preference for fantasy based social interaction to face-to-face, and history of ADHD    Progress Toward Treatment Goals:   Reported increased social engagement    Intervention & Response:  Expressive and CBT approach to exploring gender and goals around gender identity. Explored client's current state using SUDS (0-10). Client noted tendency of base lining at a 3-5. Client rated current state at 5 with a range of 2-10 of the past week; 2 being when spending time with friends and 10 when posting bail for friend. Client shared narrative of homeless friend becoming incarcerated and client's efforts to find money through social media to post bail. Explored client's motivation. Themes emerged of feeling purposeful. Explored the nature of R with friend. Client share narrative of sexual R with friend. Explored client's response to penetrative sex. Client noted dissociation with penis. With exploration, client recognized focus on others pleasure; recognized openness to sex but not interested in pursuing it; noting more motivation for sex when visualizing self as female. Further explored sexual attraction and sexuality. Themes of preference for open R or polyamory. Explored ways in which client expresses her sexual preferences. client noted  focus on partner rather than self. Explored client body dysphoria further. Client shared most significant dysphoria in reaction to facial hair. Ended session planning to discuss spironolactone at next session. Validated, reflected, and normalized client's thoughts and feelings throughout session. Client was open, responsive, and engaged throughout session.    Next session: spironolactone prep    Important supports: Pilar Ulloa [place]    Assignment:  Explore jo ann group at [facility]    Interactive Complexity:  None.    Diagnosis:  302.85 Gender Dysphoria in Adult  296.32 Major Depressive Disorder, Recurrent, moderate  300.02 Generalized Anxiety Disorder  R/O Autism    Plan / Need for Future Services:  Return for therapy in 2 weeks.      Yvette Myrick PsyD

## 2018-05-14 ENCOUNTER — OFFICE VISIT (OUTPATIENT)
Dept: OTHER | Facility: OUTPATIENT CENTER | Age: 23
End: 2018-05-14
Payer: COMMERCIAL

## 2018-05-14 VITALS
SYSTOLIC BLOOD PRESSURE: 113 MMHG | HEIGHT: 74 IN | DIASTOLIC BLOOD PRESSURE: 70 MMHG | HEART RATE: 90 BPM | WEIGHT: 268 LBS | BODY MASS INDEX: 34.39 KG/M2

## 2018-05-14 DIAGNOSIS — F64.0 GENDER DYSPHORIA IN ADOLESCENT AND ADULT: Primary | ICD-10-CM

## 2018-05-14 NOTE — MR AVS SNAPSHOT
After Visit Summary   5/14/2018    Edgard Lopez    MRN: [id number]           Patient Information     Date Of Birth          1995        Visit Information        Provider Department      5/14/2018 10:40 AM Wesley Mg MD [facility]        Today's Diagnoses     Gender dysphoria in adolescent and adult    -  1       Follow-ups after your visit        Follow-up notes from your care team     Return for exam.      Your next 10 appointments already scheduled     Jun 26, 2018 10:40 AM CDT   Return Visit with Wesley Mg MD   [facility] ([facility])    [address]  [address]  [facility] [address]   [phone]            Jul 06, 2018  1:00 PM CDT   Individual Therapy 53+ minutes with Yvette Myrick PsyD   [facility] ([facility])    [address]  [address]  [facility] [address]   [phone]            Jul 23, 2018 11:00 AM CDT   Individual Therapy 53+ minutes with Yvette Myrick PsyD   [facility] ([facility])    [address]  [address]  [facility] [address]   [phone]              Who to contact     Please call your clinic at [phone] to:    Ask questions about your health    Make or cancel appointments    Discuss your medicines    Learn about your test results    Speak to your doctor            Additional Information About Your Visit        MyChart Information     [employer] is an electronic gateway that provides easy, online access to your medical records. With [employer], you can request a clinic appointment, read your test results, renew a prescription or communicate with your care team.     To sign up for [employer] visit the website at [email or web address]   You will be asked to enter the access code listed below, as well as some personal information. Please follow the directions to create your username and password.    "  Your access code is: [email or web address]  Expires: 2018  8:15 AM     Your access code will  in 90 days. If you need help or a new code, please contact your [facility] or call [phone] for assistance.        Care EveryWhere ID     This is your Care EveryWhere ID. This could be used by other organizations to access your [place] medical records  [id number]        Your Vitals Were     Pulse Height BMI (Body Mass Index)             90 1.873 m (6' 1.75\") 34.64 kg/m2          Blood Pressure from Last 3 Encounters:   18 113/70    Weight from Last 3 Encounters:   18 121.6 kg (268 lb)   18 124.3 kg (274 lb)               Primary Care Provider Office Phone # Fax #    [facility] [phone] [address] [facility] [address]        Equal Access to Services     [facility]: Hadii elsa ku hadasho Soomaali, waaxda luqadaha, qaybta kaalmada adeegyada, waxay idiin hayinon channing waldron . So [facility] [phone].    ATENCIÓN: Si andersla espenio, tiene a wells disposición servicios gratuitos de asistencia lingüística. Llame al [phone].    We comply with applicable federal civil rights laws and [place] laws. We do not discriminate on the basis of race, color, national origin, age, disability, sex, sexual orientation, or gender identity.            Thank you!     Thank you for choosing [facility]  for your care. Our goal is always to provide you with excellent care. Hearing back from our patients is one way we can continue to improve our services. Please take a few minutes to complete the written survey that you may receive in the mail after your visit with us. Thank you!             Your Updated Medication List - Protect others around you: Learn how to safely use, store and throw away your medicines at www.disposemymeds.org.          This list is accurate as of 18 11:59 PM.  Always use your most recent med list.       "             Brand Name Dispense Instructions for use Diagnosis    CONCERTA PO      Take 36 mg by mouth daily    Gender dysphoria in adolescent and adult       SERTRALINE HCL PO      Take 150 mg by mouth daily

## 2018-05-14 NOTE — NURSING NOTE
"Chief Complaint   Patient presents with     RECHECK     TG       Vitals:    05/14/18 1107   BP: 113/70   Pulse: 90   Weight: 121.6 kg (268 lb)   Height: 1.873 m (6' 1.75\")       Body mass index is 34.64 kg/(m^2).      Yi Castellano CMA    "

## 2018-05-16 ENCOUNTER — TELEPHONE (OUTPATIENT)
Dept: OTHER | Facility: OUTPATIENT CENTER | Age: 23
End: 2018-05-16

## 2018-05-25 NOTE — PROGRESS NOTES
Transgender medical consultation at the request of Dr. Yvette Myrick.      IDENTIFICATION:  22-year-old transfeminine patient.      CHIEF CONCERN:  Antiandrogen therapy.      HISTORY OF PRESENT ILLNESS:  Patient has a longstanding history of gender dysphoria as well as significant depression and anxiety.  The diagnostic assessment of Dr. Myrick was reviewed.  Patient and Dr. Myrick have decided on using antiandrogen to address gender dysphoria and achieve some feminizing and loss of masculinizing character while further achieving mental health stability.        CURRENT MEDICAL PROBLEMS:  ADHD and depression and anxiety.      MEDICATIONS:  Sertraline and Concerta.        ALLERGIES:  No known drug allergies.        PAST MEDICAL HISTORY:  No surgeries.  Psychiatric hospitalization x1.      SOCIAL HISTORY:  Patient eats a standard diet including dairy.  Smokes 5 cigarettes once a day.  Alcohol once a week, 1-2 drinks per sitting.  No recreational substance use.  Walks or bikes once a week.  Currently working at a group home.  Has no children.      SEXUAL HISTORY:  Not currently sexually active with partner.  Has had about 6 partners in lifetime of masculine and feminine partners.  No STIs.  Has tested HIV negative in the past and is vaccinated against hepatitis B and HPV.      FAMILY HISTORY:  Unknown as patient is adopted.      REVIEW OF SYSTEMS:  Notable for alternating diarrhea and constipation with more diarrhea than constipation.  Abdominal pain and cramping with nausea.  Is in the process of workup for this and is needing to schedule a colonoscopy.  Also increased thirst and dry mouth due to Concerta.  Remainder of 12-point review of systems was otherwise unremarkable except where noted above.  Last psychiatric hospitalization was in 2015.       As only 20 minutes was scheduled for this visit, reviewed current vitals as noted by nursing above, within normal limits except for BMI with elevated  BMI of 34.6, weight 268  pounds.      ASSESSMENT/PLAN:   1.  Gender dysphoria.   2.  Depression.        Discussed the risks and benefits of spironolactone for androgen suppression.  Discussed how this medication affects testosterone and its effects on physical appearance.  Discussed the potential side effects including lowering blood pressure, possible lightheadedness, increased urination, potential for dehydration or constipation as well as hypercalcemia.  Will do physical exam and complete written consent at next visit.  Ordered baseline labs including testosterone and comprehensive metabolic panel and to followup for exam, written consent and potential start of medication at next visit when available.       A total of  30 min was spent with the patient, of which greater than 50% was spent counseling regarding the issues documented above

## 2018-05-31 DIAGNOSIS — F64.0 GENDER DYSPHORIA IN ADOLESCENT AND ADULT: ICD-10-CM

## 2018-05-31 LAB
ALBUMIN SERPL-MCNC: 4 G/DL (ref 3.3–4.6)
ALP SERPL-CCNC: 49 U/L (ref 40–150)
ALT SERPL-CCNC: 29 U/L (ref 0–70)
AST SERPL-CCNC: 27 U/L (ref 0–55)
BILIRUB SERPL-MCNC: 0.7 MG/DL (ref 0.2–1.3)
BUN SERPL-MCNC: 12 MG/DL (ref 5–24)
CALCIUM SERPL-MCNC: 9.3 MG/DL (ref 8.5–10.4)
CHLORIDE SERPLBLD-SCNC: 103 MMOL/L (ref 94–109)
CO2 SERPL-SCNC: 27 MMOL/L (ref 20–32)
CREAT SERPL-MCNC: 0.7 MG/DL (ref 0.8–1.5)
EGFR CALCULATED (BLACK REFERENCE): 179.7
EGFR CALCULATED (NON BLACK REFERENCE): 148.5
GLUCOSE SERPL-MCNC: 100 MG/DL (ref 60–109)
POTASSIUM SERPL-SCNC: 4.1 MMOL/L (ref 3.4–5.3)
PROT SERPL-MCNC: 8 G/DL (ref 6.8–8.8)
SODIUM SERPL-SCNC: 140 MMOL/L (ref 133–144)

## 2018-06-05 LAB
SHBG SERPL-SCNC: 25 NMOL/L (ref 11–80)
TESTOST FREE SERPL-MCNC: 12.97 NG/DL (ref 4.7–24.4)
TESTOST SERPL-MCNC: 528 NG/DL (ref 240–950)

## 2018-06-25 ENCOUNTER — APPOINTMENT (OUTPATIENT)
Dept: GENERAL RADIOLOGY | Facility: CLINIC | Age: 23
End: 2018-06-25
Attending: INTERNAL MEDICINE
Payer: COMMERCIAL

## 2018-06-25 ENCOUNTER — RECORDS - HEALTHEAST (OUTPATIENT)
Dept: ADMINISTRATIVE | Facility: OTHER | Age: 23
End: 2018-06-25

## 2018-06-25 ENCOUNTER — HOSPITAL ENCOUNTER (EMERGENCY)
Facility: CLINIC | Age: 23
Discharge: HOME | End: 2018-06-25
Attending: INTERNAL MEDICINE | Admitting: INTERNAL MEDICINE
Payer: COMMERCIAL

## 2018-06-25 VITALS
BODY MASS INDEX: 30.8 KG/M2 | TEMPERATURE: 98.3 F | SYSTOLIC BLOOD PRESSURE: 130 MMHG | HEIGHT: 74 IN | HEART RATE: 80 BPM | OXYGEN SATURATION: 97 % | RESPIRATION RATE: 18 BRPM | WEIGHT: 240 LBS | DIASTOLIC BLOOD PRESSURE: 79 MMHG

## 2018-06-25 DIAGNOSIS — S43.005A SHOULDER DISLOCATION, LEFT, INITIAL ENCOUNTER: ICD-10-CM

## 2018-06-25 PROCEDURE — 25000128 H RX IP 250 OP 636: Performed by: INTERNAL MEDICINE

## 2018-06-25 PROCEDURE — 73030 X-RAY EXAM OF SHOULDER: CPT | Mod: LT

## 2018-06-25 PROCEDURE — 40000986 XR SHOULDER LT PORT G/E 2 VW: Mod: LT

## 2018-06-25 PROCEDURE — 99152 MOD SED SAME PHYS/QHP 5/>YRS: CPT

## 2018-06-25 PROCEDURE — 96374 THER/PROPH/DIAG INJ IV PUSH: CPT

## 2018-06-25 PROCEDURE — 96376 TX/PRO/DX INJ SAME DRUG ADON: CPT

## 2018-06-25 PROCEDURE — 99285 EMERGENCY DEPT VISIT HI MDM: CPT | Mod: 25

## 2018-06-25 PROCEDURE — 40000275 ZZH STATISTIC RCP TIME EA 10 MIN

## 2018-06-25 PROCEDURE — 40000965 ZZH STATISTIC END TITIAL CO2 MONITORING

## 2018-06-25 PROCEDURE — 23650 CLTX SHO DSLC W/MNPJ WO ANES: CPT | Mod: LT

## 2018-06-25 PROCEDURE — 96375 TX/PRO/DX INJ NEW DRUG ADDON: CPT

## 2018-06-25 RX ORDER — HYDROCODONE BITARTRATE AND ACETAMINOPHEN 5; 325 MG/1; MG/1
1 TABLET ORAL EVERY 6 HOURS PRN
Qty: 10 TABLET | Refills: 0 | Status: SHIPPED | OUTPATIENT
Start: 2018-06-25 | End: 2018-08-09

## 2018-06-25 RX ORDER — HYDROMORPHONE HYDROCHLORIDE 1 MG/ML
0.5 INJECTION, SOLUTION INTRAMUSCULAR; INTRAVENOUS; SUBCUTANEOUS
Status: DISCONTINUED | OUTPATIENT
Start: 2018-06-25 | End: 2018-06-25 | Stop reason: HOSPADM

## 2018-06-25 RX ORDER — KETOROLAC TROMETHAMINE 15 MG/ML
15 INJECTION, SOLUTION INTRAMUSCULAR; INTRAVENOUS ONCE
Status: COMPLETED | OUTPATIENT
Start: 2018-06-25 | End: 2018-06-25

## 2018-06-25 RX ORDER — PROPOFOL 10 MG/ML
INJECTION, EMULSION INTRAVENOUS
Status: DISCONTINUED
Start: 2018-06-25 | End: 2018-06-25 | Stop reason: WASHOUT

## 2018-06-25 RX ORDER — PROPOFOL 10 MG/ML
200 INJECTION, EMULSION INTRAVENOUS ONCE
Status: DISCONTINUED | OUTPATIENT
Start: 2018-06-25 | End: 2018-06-25 | Stop reason: HOSPADM

## 2018-06-25 RX ADMIN — Medication 0.5 MG: at 16:16

## 2018-06-25 RX ADMIN — HYDROMORPHONE HYDROCHLORIDE 1 MG: 1 INJECTION, SOLUTION INTRAMUSCULAR; INTRAVENOUS; SUBCUTANEOUS at 15:55

## 2018-06-25 RX ADMIN — Medication 0.5 MG: at 16:43

## 2018-06-25 RX ADMIN — KETOROLAC TROMETHAMINE 15 MG: 15 INJECTION, SOLUTION INTRAMUSCULAR; INTRAVENOUS at 16:16

## 2018-06-25 ASSESSMENT — ENCOUNTER SYMPTOMS
SHORTNESS OF BREATH: 0
ARTHRALGIAS: 1
ABDOMINAL PAIN: 0
NECK PAIN: 0

## 2018-06-25 NOTE — LETTER
June 25, 2018      To Whom It May Concern:      Edgard Lopez was seen in our Emergency Department today, 06/25/18.  I expect his condition to improve over the next 3-4 days.  He may return to work/school when improved.    Sincerely,        Anat Campoverde MD

## 2018-06-25 NOTE — ED AVS SNAPSHOT
[facility] Emergency Department    [address]    [facility] [address]    Phone:  [phone]    Fax:  [phone]                                       Edgard Lopez   MRN: [id number]    Department:  [facility] Emergency Department   Date of Visit:  6/25/2018           Patient Information     Date Of Birth          1995        Your diagnoses for this visit were:     Shoulder dislocation, left, initial encounter        You were seen by Anat Campoverde MD.      Follow-up Information     [facility].    Contact information:    [address]  [address]  [facility] [address] [phone]        Discharge Instructions         Dislocation: Shoulder (Reduced)    A shoulder is dislocated when a strong force injures, and possibly tears the ligaments that hold the shoulder joint together. The bones that make up the joint then move apart and become stuck out of place. The joint must be put back in place. Then it will take several weeks for the shoulder to heal. This injury may weaken the ligaments. Weakened ligaments put you at risk for another dislocation. Another dislocation can happen even if you are hit with less force.  Shoulder dislocation is treated with a special type of arm sling called a shoulder immobilizer. This keeps your arm close to your body. This stops the shoulder from dislocating again while the ligaments heal. After a few weeks, you may start an exercise program. This will gradually bring back your range-of-motion and shoulder strength. It will also lower your risk for another dislocation.  Home care  Follow these tips for taking care of yourself at home:    Until your next doctor visit, wear your shoulder immobilizer at all times. Don t take it off at night to sleep. This is because it s possible to dislocate your arm again in your sleep. You can take it off to bathe or dress. But  don t move your arm away from your body. Keep your arm in the same position that the sling was holding it in until you put the sling back on. During your next visit, ask your doctor how long you should wear the sling.    Apply an ice pack over the injured area for 20 minutes every 1 to 2 hours the first day. You can make an ice pack by putting ice cubes in a plastic bag. Wrap the bag in a towel before putting it on your shoulder. Continue with ice packs 3 to 4 times a day for the next 2 to 3 days. Then use the ice as needed to relieve pain and swelling.    You may use acetaminophen or ibuprofen to control pain, unless another pain medicine was prescribed. If you have chronic liver or kidney disease or ever had a stomach ulcer or gastrointestinal bleeding, talk with your doctor before using these medicines.    Don t take part in sports or physical education classes until your doctor says it s OK.  Follow-up care  Follow up with your healthcare provider, or as advised. You shouldn t wear your shoulder immobilizer or sling for more than a few weeks without taking it off. Keeping it on for a longer time may limit your range-of-motion at the shoulder joint. If you have had several dislocations of the same shoulder, you may have permanent damage to the ligaments. Ask an orthopedic doctor about surgery to prevent another dislocation.  When to seek medical advice  Call your healthcare provider right away if any of these occur:    Another dislocation of your shoulder    Swelling or pain in the shoulder or arm that gets worse    Your fingers become cold, blue, numb or tingly    Fever or chills  Date Last Reviewed: 8/2/2016 2000-2017 [employer]. [address] [place] [address], [place] [address]. All rights reserved. This information is not intended as a substitute for professional medical care. Always follow your healthcare professional's instructions.          Discharge References/Attachments     SEDATION,  PROCEDURAL (ADULT) (ENGLISH)      Your next 10 appointments already scheduled     Jun 26, 2018 10:40 AM CDT   Return Visit with Wesley Mg MD   [facility] ([facility])    [address]  Mail [address] 7521  [facility] [address]   [phone]            Jul 06, 2018  1:00 PM CDT   Individual Therapy 53+ minutes with Yvette Myrick PsyD   [facility] ([facility])    [address]  Mail [address] 7521  [facility] [address]   [phone]            Jul 23, 2018 11:00 AM CDT   Individual Therapy 53+ minutes with Yvette Myrick PsyD   [facility] ([facility])    [address]  Mail [address]  [facility] [address]   [phone]              24 Hour Appointment Hotline       To make an appointment at any [facility], call [phone] ([phone]). If you don't have a family doctor or clinic, we will help you find one. [facility] are conveniently located to serve the needs of you and your family.             Review of your medicines      START taking        Dose / Directions Last dose taken    HYDROcodone-acetaminophen 5-325 MG per tablet   Commonly known as:  NORCO   Dose:  1 tablet   Quantity:  10 tablet        Take 1 tablet by mouth every 6 hours as needed for severe pain   Refills:  0          Our records show that you are taking the medicines listed below. If these are incorrect, please call your family doctor or clinic.        Dose / Directions Last dose taken    CONCERTA PO   Dose:  36 mg        Take 36 mg by mouth daily   Refills:  0        SERTRALINE HCL PO   Dose:  150 mg        Take 150 mg by mouth daily   Refills:  0                Information about OPIOIDS     PRESCRIPTION OPIOIDS: WHAT YOU NEED TO KNOW   We gave you an opioid (narcotic) pain medicine. It is important to manage your pain, but opioids are not always the best choice. You should first try all the other options  your care team gave you. Take this medicine for as short a time (and as few doses) as possible.     These medicines have risks:    DO NOT drive when on new or higher doses of pain medicine. These medicines can affect your alertness and reaction times, and you could be arrested for driving under the influence (DUI). If you need to use opioids long-term, talk to your care team about driving.    DO NOT operate heave machinery    DO NOT do any other dangerous activities while taking these medicines.     DO NOT drink any alcohol while taking these medicines.      If the opioid prescribed includes acetaminophen, DO NOT take with any other medicines that contain acetaminophen. Read all labels carefully. Look for the word  acetaminophen  or  Tylenol.  Ask your pharmacist if you have questions or are unsure.    You can get addicted to pain medicines, especially if you have a history of addiction (chemical, alcohol or substance dependence). Talk to your care team about ways to reduce this risk.    Store your pills in a secure place, locked if possible. We will not replace any lost or stolen medicine. If you don t finish your medicine, please throw away (dispose) as directed by your pharmacist. The [place] Pollution Control Agency has more information about safe disposal: https://www.pca.[facility].[place].us/living-green/managing-unwanted-medications.     All opioids tend to cause constipation. Drink plenty of water and eat foods that have a lot of fiber, such as fruits, vegetables, prune juice, apple juice and high-fiber cereal. Take a laxative (Miralax, milk of magnesia, Colace, Senna) if you don t move your bowels at least every other day.         Prescriptions were sent or printed at these locations (1 Prescription)                   Other Prescriptions                Printed at Department/Unit printer (1 of 1)         HYDROcodone-acetaminophen (NORCO) 5-325 MG per tablet                Procedures and tests performed during your  visit     Shoulder XR, left  2-3 vw PORTABLE    XR Shoulder Left 2 Views      Orders Needing Specimen Collection     None      Pending Results     Date and Time Order Name Status Description    6/25/2018 1659 Shoulder XR, left  2-3 vw PORTABLE Preliminary             Pending Culture Results     No orders found from 6/23/2018 to 6/26/2018.            Pending Results Instructions     If you had any lab results that were not finalized at the time of your Discharge, you can call the ED Lab Result RN at [phone]. You will be contacted by this team for any positive Lab results or changes in treatment. The nurses are available 7 days a week from 10A to 6:30P.  You can leave a message 24 hours per day and they will return your call.        Test Results From Your Hospital Stay              6/25/2018  4:38 PM      Narrative     XR SHOULDER 2 VIEW LEFT 6/25/2018 4:35 PM    COMPARISON: None.    HISTORY: Fall, dislocation.        Impression     IMPRESSION: Left glenohumeral joint anterior dislocation. No definite  fractures are seen. Normal alignment of the acromioclavicular joint.    ALBA AMAYA MD         6/25/2018  5:25 PM      Narrative     PORTABLE LEFT SHOULDER TWO OR MORE VIEWS  6/25/2018 5:19 PM      HISTORY: Post reduction.    COMPARISON: 6/25/2018.        Impression     IMPRESSION: The humeral head has been successfully reduced into the  glenoid fossa. No evidence of fracture.                Clinical Quality Measure: Blood Pressure Screening     Your blood pressure was checked while you were in the emergency department today. The last reading we obtained was  BP: 130/79 . Please read the guidelines below about what these numbers mean and what you should do about them.  If your systolic blood pressure (the top number) is less than 120 and your diastolic blood pressure (the bottom number) is less than 80, then your blood pressure is normal. There is nothing more that you need to do about it.  If your systolic blood  "pressure (the top number) is 120-139 or your diastolic blood pressure (the bottom number) is 80-89, your blood pressure may be higher than it should be. You should have your blood pressure rechecked within a year by a primary care provider.  If your systolic blood pressure (the top number) is 140 or greater or your diastolic blood pressure (the bottom number) is 90 or greater, you may have high blood pressure. High blood pressure is treatable, but if left untreated over time it can put you at risk for heart attack, stroke, or kidney failure. You should have your blood pressure rechecked by a primary care provider within the next 4 weeks.  If your provider in the emergency department today gave you specific instructions to follow-up with your doctor or provider even sooner than that, you should follow that instruction and not wait for up to 4 weeks for your follow-up visit.        Thank you for choosing [facility]       Thank you for choosing [facility] for your care. Our goal is always to provide you with excellent care. Hearing back from our patients is one way we can continue to improve our services. Please take a few minutes to complete the written survey that you may receive in the mail after you visit with us. Thank you!        [employer] Information     [employer] lets you send messages to your doctor, view your test results, renew your prescriptions, schedule appointments and more. To sign up, go to [email or web address] . Click on \"Log in\" on the left side of the screen, which will take you to the Welcome page. Then click on \"Sign up Now\" on the right side of the page.     You will be asked to enter the access code listed below, as well as some personal information. Please follow the directions to create your username and password.     Your access code is: [email or web address]  Expires: 2018  8:15 AM     Your access code will  in 90 days. If you need help or a new code, please call your [facility] clinic or " [phone].        Care EveryWhere ID     This is your Care EveryWhere ID. This could be used by other organizations to access your [place] [facility] medical records  [id number]        Equal Access to Services     AVELINO LAWSON : Dorina Villarreal, wakipda davidadaha, qaybta kaalmada richard, aram eugene. So [facility] [phone].    ATENCIÓN: Si habla español, tiene a wells disposición servicios gratuitos de asistencia lingüística. Llame al [phone].    We comply with applicable federal civil rights laws and [place] laws. We do not discriminate on the basis of race, color, national origin, age, disability, sex, sexual orientation, or gender identity.            After Visit Summary       This is your record. Keep this with you and show to your community pharmacist(s) and doctor(s) at your next visit.

## 2018-06-25 NOTE — PROGRESS NOTES
"Pt was placed on an ETCO2 monitor with 3 LPM bled in. Ambu bag, suction, and airway cart were all ready but not needed, pt was able to maintain his airway throughout the procedure without any intervention needed.    Vital signs:  Temp: 98.3  F (36.8  C) Temp src: Oral BP: (!) 148/101 Pulse: 80   Resp: 18 SpO2: 96 % O2 Device: None (Room air)   Height: 188 cm (6' 2\") Weight: 108.9 kg (240 lb)  Estimated body mass index is 30.81 kg/(m^2) as calculated from the following:    Height as of this encounter: 1.88 m (6' 2\").    Weight as of this encounter: 108.9 kg (240 lb).    PAST MEDICAL HISTORY:   Past Medical History:   Diagnosis Date     Anxiety      Depressive disorder        PAST SURGICAL HISTORY:   Past Surgical History:   Procedure Laterality Date     wisdom teeth         FAMILY HISTORY: No family history on file.    SOCIAL HISTORY:   Social History   Substance Use Topics     Smoking status: Current Every Day Smoker     Packs/day: 0.50     Types: Cigarettes, Cigars, cigarillos or filtered cigars     Smokeless tobacco: Never Used     Alcohol use Yes      Comment: once weekly     Seven Burger RT  6/25/2018    "

## 2018-06-25 NOTE — DISCHARGE INSTRUCTIONS
Dislocation: Shoulder (Reduced)    A shoulder is dislocated when a strong force injures, and possibly tears the ligaments that hold the shoulder joint together. The bones that make up the joint then move apart and become stuck out of place. The joint must be put back in place. Then it will take several weeks for the shoulder to heal. This injury may weaken the ligaments. Weakened ligaments put you at risk for another dislocation. Another dislocation can happen even if you are hit with less force.  Shoulder dislocation is treated with a special type of arm sling called a shoulder immobilizer. This keeps your arm close to your body. This stops the shoulder from dislocating again while the ligaments heal. After a few weeks, you may start an exercise program. This will gradually bring back your range-of-motion and shoulder strength. It will also lower your risk for another dislocation.  Home care  Follow these tips for taking care of yourself at home:    Until your next doctor visit, wear your shoulder immobilizer at all times. Don t take it off at night to sleep. This is because it s possible to dislocate your arm again in your sleep. You can take it off to bathe or dress. But don t move your arm away from your body. Keep your arm in the same position that the sling was holding it in until you put the sling back on. During your next visit, ask your doctor how long you should wear the sling.    Apply an ice pack over the injured area for 20 minutes every 1 to 2 hours the first day. You can make an ice pack by putting ice cubes in a plastic bag. Wrap the bag in a towel before putting it on your shoulder. Continue with ice packs 3 to 4 times a day for the next 2 to 3 days. Then use the ice as needed to relieve pain and swelling.    You may use acetaminophen or ibuprofen to control pain, unless another pain medicine was prescribed. If you have chronic liver or kidney disease or ever had a stomach ulcer or  gastrointestinal bleeding, talk with your doctor before using these medicines.    Don t take part in sports or physical education classes until your doctor says it s OK.  Follow-up care  Follow up with your healthcare provider, or as advised. You shouldn t wear your shoulder immobilizer or sling for more than a few weeks without taking it off. Keeping it on for a longer time may limit your range-of-motion at the shoulder joint. If you have had several dislocations of the same shoulder, you may have permanent damage to the ligaments. Ask an orthopedic doctor about surgery to prevent another dislocation.  When to seek medical advice  Call your healthcare provider right away if any of these occur:    Another dislocation of your shoulder    Swelling or pain in the shoulder or arm that gets worse    Your fingers become cold, blue, numb or tingly    Fever or chills  Date Last Reviewed: 8/2/2016 2000-2017 [employer]. [address] [place] [address]. All rights reserved. This information is not intended as a substitute for professional medical care. Always follow your healthcare professional's instructions.

## 2018-06-25 NOTE — ED TRIAGE NOTES
Pt at group home, slipped and fell down about 6 stairs landing on L shoulder. Instant pain. Denies hitting head or LOC. Feels as if shoulder is dislocated. Given 100 mcg of Fentanyl by EMS. Alert and oriented. ABC intact. C-collar in place by EMS due to fall. Denies pain anywhere other than shoulder.

## 2018-06-25 NOTE — ED NOTES
Pt provided with discharge paperwork and educated on recommended follow-up with PCP. Pt educated on how to manage pain at home with prescribed prescription medications along with non prescription methods. Pt voiced understanding and denied any questions at discharge. Ambulated to lobby with dad.

## 2018-06-25 NOTE — ED PROVIDER NOTES
"  History     Chief Complaint:  Shoulder Pain    HPI   Edgard Lopez is a 22 year old male who presents to the ED for the evaluation of left shoulder pain. Today, the patient fell face first down six stairs and injured his left shoulder and believes he dislocated it.  The patient has no loss of consciousness, head trauma, neck pain, chest pain, abdominal pain, other injuries, or difficulty breathing. Of note, the patient last ate a small meal at 11:00aM.    Allergies:  No known drug allergies    Medications:    Methylphenidate  Sertraline    Past Medical History:    Anxiety  Depression    Past Surgical History:    [place] teeth extractions    Family History:    History reviewed. No pertinent family history.     Social History:  Smoking status: Current every day smoker, 0.50ppd  Substance use: Marijuana   Alcohol use: Yes  Presents to ED with parents    Marital Status:  Single [1]     Review of Systems   Respiratory: Negative for shortness of breath.    Cardiovascular: Negative for chest pain.   Gastrointestinal: Negative for abdominal pain.   Musculoskeletal: Positive for arthralgias. Negative for neck pain.   Neurological: Negative for syncope.   All other systems reviewed and are negative.    Physical Exam     Patient Vitals for the past 24 hrs:   BP Temp Temp src Pulse Resp SpO2 Height Weight   06/25/18 1730 130/79 - - - - 97 % - -   06/25/18 1715 131/84 - - - - 95 % - -   06/25/18 1546 (!) 148/101 98.3  F (36.8  C) Oral 80 18 96 % 1.88 m (6' 2\") 108.9 kg (240 lb)   06/25/18 1545 (!) 137/93 - - - - 96 % - -     Physical Exam   Constitutional: He is cooperative. He appears distressed.   HENT:   Mouth/Throat: Oropharynx is clear and moist and mucous membranes are normal.   Eyes: Conjunctivae are normal.   Neck: Normal range of motion and full passive range of motion without pain. No spinous process tenderness present.   Cardiovascular: Regular rhythm and normal heart sounds.    Pulmonary/Chest: Effort normal " and breath sounds normal.   Abdominal: Soft. Normal appearance and bowel sounds are normal. There is no rebound and no guarding.   Musculoskeletal: Normal range of motion.        Left shoulder: He exhibits tenderness and deformity.   Lymphadenopathy:     He has no cervical adenopathy.   Neurological: He is alert. No sensory deficit.   Skin: Skin is warm and dry.   Psychiatric: He has a normal mood and affect.     Emergency Department Course     Imaging:  Radiographic findings were communicated with the patient and family who voiced understanding of the findings.    XR Shoulder Left 2 Views, Pre-Reduction  IMPRESSION: Left glenohumeral joint anterior dislocation. No definite  fractures are seen. Normal alignment of the acromioclavicular joint. As read by Radiology.     Shoulder XR, Left 2-3 Views Portable, Post-Reduction  IMPRESSION: The humeral head has been successfully reduced into the  glenoid fossa. No evidence of fracture. As read by Radiology.     Procedures:    [facility] Procedure Note       Sedation      Performed by: Anat Campoverde MD  Authorized by: Anat Campoverde MD    Pre-Procedure Assessment done at 1700.    Expected Level:  Deep Sedation    Indication:  Sedation is required to allow for joint reduction    Consent obtained from patient after discussing the risks, benefits and alternatives.    PO Intake:  Appropriately NPO for procedure    ASA Class:  Class 1 - HEALTHY PATIENT    Mallampati:  Grade 1:  Soft palate, uvula, tonsillar pillars, and posterior pharyngeal wall visible    Lungs: Lungs Clear with good breath sounds bilaterally.     Heart: Normal heart sounds and rate    History and physical reviewed and no updates needed. I have reviewed the lab findings, diagnostic data, medications, and the plan for sedation. I have determined this patient to be an appropriate candidate for the planned sedation and procedure and have reassessed the patient IMMEDIATELY PRIOR to sedation and  procedure.  Sedation Post Procedure Summary:    Prior to the start of the procedure and with procedural staff participation, I verbally confirmed the patient s identity using two indicators, relevant allergies, that the procedure was appropriate and matched the consent or emergent situation, and that the correct equipment/implants were available. Immediately prior to starting the procedure I conducted the Time Out with the procedural staff and re-confirmed the patient s name, procedure, and site/side. (The Joint Commission universal protocol was followed.)  Yes      Sedatives: Propofol    Vital signs, airway, End Tidal CO2 and pulse oximetry were monitored and remained stable throughout the procedure and sedation was maintained until the procedure was complete.  The patient was monitored by staff until sedation discharge criteria were met.    Patient tolerance: Patient tolerated the procedure well with no immediate complications.    Time of sedation in minutes:  10 minutes from beginning to end of physician one to one monitoring.     Reduction       Location: Left shoulder      Consent:  Risks, benefits and alternatives were discussed with patient and consent for procedure was obtained.     Timeout:  Universal protocol was followed. TIME OUT conducted just prior to starting procedure confirmed patient identity, site/side, procedure, patient position, and availability of correct equipment and implants? Yes      Medication:  Propofol: IV     Procedure Note:  Left arm was externally rotated and abducted. The humerus was then rocked with gentle longitudinal traction until pop was felt and normal shoulder contour returned.      Patient Status:  Patient tolerated the procedure well.  There were no complications.   Interventions:  1555: Dilaudid 1mg IV  1616: Toradol 15mg IV  1616: Dilaudid 0.5mg IV  1643: Dilaudid 0.5mg IV    Emergency Department Course:  Patient arrived by EMS.     Past medical records, nursing notes,  and vitals reviewed.  1548: I performed an exam of the patient and obtained history, as documented above.    The patient was sent for a left shoulder x-ray while in the emergency department, findings above.    1634: I rechecked the patient. Explained findings to patient and parents.    1652: I performed the sedated dislocated reduction as noted above.    1704: The patient had a portable left shoulder x-ray while in the emergency department, findings above.    1729: I rechecked the patient. Explained findings to patient and parents.    Findings and plan explained to the Patient and mother and father. Patient discharged home with instructions regarding supportive care, medications, and reasons to return. The importance of close follow-up was reviewed.     Impression & Plan      Medical Decision Making:  Edgard Lopez is a 22 year old male who presents to the emergency department after a fall.  He had palpable deformity of the left shoulder, x-ray confirms anterior dislocation.  This was reduced as noted.  Initially the patient was in a hard collar as a precaution though he did not complain of any neck pain.  Without any evidence of injury above the clavicles or complaints of neck pain.  I clinically cleared his cervical spine.  Patient is placed in a shoulder immobilizer.  I will discharge him with shoulder dislocation instructions, close follow-up with orthopedics, [place] as needed.  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    Diagnosis:    ICD-10-CM   1. Shoulder dislocation, left, initial encounter S43.005A     Disposition: Patient discharged to home with parents      Discharge Medications:  New Prescriptions    HYDROCODONE-ACETAMINOPHEN (NORCO) 5-325 MG PER TABLET    Take 1 tablet by mouth every 6 hours as needed for severe pain     Hansel Masters  6/25/2018   [facility] EMERGENCY DEPARTMENT    Scribe Disclosure:  Hansel ALEJANDRE  Guerrero, am serving as a scribe at 3:48 PM on 6/25/2018 to document services personally performed by Anat Campoverde MD based on my observations and the provider's statements to me.        Anat Campoverde MD  06/30/18 1011

## 2018-06-25 NOTE — ED AVS SNAPSHOT
[facility] Emergency Department    [address]    [facility] [address]    Phone:  [phone]    Fax:  [phone]                                       Edgard Lopez   MRN: [id number]    Department:  [facility] Emergency Department   Date of Visit:  6/25/2018           After Visit Summary Signature Page     I have received my discharge instructions, and my questions have been answered. I have discussed any challenges I see with this plan with the nurse or doctor.    ..........................................................................................................................................  Patient/Patient Representative Signature      ..........................................................................................................................................  Patient Representative Print Name and Relationship to Patient    ..................................................               ................................................  Date                                            Time    ..........................................................................................................................................  Reviewed by Signature/Title    ...................................................              ..............................................  Date                                                            Time

## 2018-06-25 NOTE — ED NOTES
Bed: ED36  Expected date: 6/25/18  Expected time: 3:26 PM  Means of arrival: Ambulance  Comments:  jhonathan Reese  21yo M

## 2018-06-26 ENCOUNTER — OFFICE VISIT (OUTPATIENT)
Dept: OTHER | Facility: OUTPATIENT CENTER | Age: 23
End: 2018-06-26
Payer: COMMERCIAL

## 2018-06-26 VITALS
HEIGHT: 74 IN | SYSTOLIC BLOOD PRESSURE: 121 MMHG | BODY MASS INDEX: 33.88 KG/M2 | DIASTOLIC BLOOD PRESSURE: 69 MMHG | WEIGHT: 264 LBS | HEART RATE: 79 BPM

## 2018-06-26 DIAGNOSIS — F64.0 GENDER DYSPHORIA IN ADOLESCENT AND ADULT: Primary | ICD-10-CM

## 2018-06-26 RX ORDER — SPIRONOLACTONE 100 MG/1
100 TABLET, FILM COATED ORAL DAILY
Qty: 30 TABLET | Refills: 1 | Status: SHIPPED | OUTPATIENT
Start: 2018-06-26 | End: 2018-08-09

## 2018-06-26 ASSESSMENT — ENCOUNTER SYMPTOMS
CHILLS: 0
PALPITATIONS: 0
NUMBNESS: 0
FEVER: 0
ABDOMINAL PAIN: 0
DYSPHORIC MOOD: 0
FREQUENCY: 0
WEAKNESS: 0
CHEST TIGHTNESS: 0
HEADACHES: 0
UNEXPECTED WEIGHT CHANGE: 0
SHORTNESS OF BREATH: 0
LIGHT-HEADEDNESS: 0
NERVOUS/ANXIOUS: 0
POLYDIPSIA: 0
VOMITING: 0

## 2018-06-26 NOTE — PROGRESS NOTES
ALEJANDRA Lundberg is a 22 year old individual that uses pronouns She/Her/Hers/Herself that presents today for follow up of:  feminizing hormone therapy.   Gender identity: feminine.   Started Hormone  therapy  n/a  Continues on n/a  Any special concerns today?  no current concerns, ready to start spironolacone  On hormones?  No  Gender related body changes since last visit: n/a    Breakthrough bleeding? Does Not Apply  Activity: unchanged   New health concerns since last visit:dislocated L shoulder, fell down stairs at work.  ---    Past Surgical History:   Procedure Laterality Date     wisdom teeth         Patient Active Problem List   Diagnosis     Gender dysphoria in adolescent and adult     Major depressive disorder without psychotic features     Other specified anxiety disorders     Marijuana abuse     Major depressive disorder, recurrent episode, moderate (H)     JOSE ANTONIO (generalized anxiety disorder)       Current Outpatient Prescriptions   Medication Sig Dispense Refill     HYDROcodone-acetaminophen (NORCO) 5-325 MG per tablet Take 1 tablet by mouth every 6 hours as needed for severe pain 10 tablet 0     Methylphenidate HCl (CONCERTA PO) Take 36 mg by mouth daily       SERTRALINE HCL PO Take 150 mg by mouth daily          History   Smoking Status     Current Every Day Smoker     Packs/day: 0.50     Types: Cigarettes, Cigars, cigarillos or filtered cigars   Smokeless Tobacco     Never Used        No Known Allergies    Health Maintenance Due   Topic Date Due     DEPRESSION ACTION PLAN Q1 YR  08/21/2012     HIV SCREEN (SYSTEM ASSIGNED)  08/21/2012     JOSE ANTONIO QUESTIONNAIRE 3 MONTHS  05/23/2018     PHQ-9 Q3 MONTHS  05/23/2018         Problem, Medication and Allergy Lists were reviewed and are current..         Review of Systems:   Review of Systems   Constitutional: Negative for chills, fever and unexpected weight change.   Eyes: Negative for visual disturbance.   Respiratory: Negative for chest tightness and shortness  "of breath.    Cardiovascular: Negative for chest pain, palpitations and leg swelling.   Gastrointestinal: Negative for abdominal pain and vomiting.   Endocrine: Negative for polydipsia and polyuria.   Genitourinary: Negative for frequency.   Neurological: Negative for weakness, light-headedness, numbness and headaches.   Psychiatric/Behavioral: Negative for dysphoric mood and suicidal ideas. The patient is not nervous/anxious.             Physical Exam:     Vitals:    06/26/18 1112   BP: 121/69   Pulse: 79   Weight: 119.7 kg (264 lb)   Height: 1.88 m (6' 2\")     BMI= Body mass index is 33.9 kg/(m^2).   Wt Readings from Last 10 Encounters:   06/26/18 119.7 kg (264 lb)   06/25/18 108.9 kg (240 lb)   05/14/18 121.6 kg (268 lb)   01/29/18 124.3 kg (274 lb)     Appearance: Female appearance and dress    GENERAL:: healthy, alert and no distress  RESP: lungs clear to auscultation - no rales, no rhonchi, no wheezes  CV: regular rates and rhythm, normal S1 S2, no S3 or S4 and no murmur, no click or rub -  / Breast, hips per flowsheet    Affect: Appropriate/mood-congruent           Labs:   Results from last visit:  Orders Only on 05/31/2018   Component Date Value Ref Range Status     Testosterone Total 05/31/2018 528  240 - 950 ng/dL Final    Comment: This test was developed and its performance characteristics determined by the   [facility]  [facility]. It has   not been cleared or approved by the FDA. The laboratory is regulated under   CLIA as qualified to perform high-complexity testing. This test is used for   clinical purposes. It should not be regarded as investigational or for   research.       Sex Hormone Binding Globulin 05/31/2018 25  11 - 80 nmol/L Final     Free Testosterone Calculated 05/31/2018 12.97  4.7 - 24.4 ng/dL Final     Glucose 05/31/2018 100.0  60.0 - 109.0 mg/dL Final     Urea Nitrogen 05/31/2018 12.0  5.0 - 24.0 mg/dL Final     Calcium 05/31/2018 9.3  8.5 " - 10.4 mg/dL Final     Creatinine 05/31/2018 0.7* 0.8 - 1.5 mg/dL Final     eGFR Calculated (Non Black Referen* 05/31/2018 148.5  >60.0 Final     eGFR Calculated (Black Reference) 05/31/2018 179.7  >60.0 Final     Sodium 05/31/2018 140.0  133.0 - 144.0 mmol/L Final     Potassium 05/31/2018 4.1  3.4 - 5.3 mmol/L Final     Chloride 05/31/2018 103.0  94.0 - 109.0 mmol/L Final     Carbon Dioxide 05/31/2018 27.0  20.0 - 32.0 mmol/L Final     Albumin 05/31/2018 4.0  3.3 - 4.6 g/dL Final     Alkaline Phosphatase 05/31/2018 49.0  40.0 - 150.0 U/L Final     ALT 05/31/2018 29.0  0.0 - 70.0 U/L Final     AST 05/31/2018 27.0  0.0 - 55.0 U/L Final     Bilirubin Total 05/31/2018 0.7  0.2 - 1.3 mg/dL Final     Protein Total 05/31/2018 8.0  6.8 - 8.8 g/dL Final       Assessment and Plan   1. Gender dysphoria  Written consent for hormone therapy reviewed and obtained  Star Spironolactone  100 mg daily   Check BMP in 1 month,       Follow up:  Follow up in 1-2 months.  Results by mychart  Questions were elicited and answered.     Wesley Mg MD

## 2018-06-26 NOTE — NURSING NOTE
"Chief Complaint   Patient presents with     RECHECK     TG       Vitals:    06/26/18 1112   BP: 121/69   Pulse: 79   Weight: 119.7 kg (264 lb)   Height: 1.88 m (6' 2\")       Body mass index is 33.9 kg/(m^2).      Yi Castellano CMA      "

## 2018-06-26 NOTE — MR AVS SNAPSHOT
After Visit Summary   2018    Edgard Lopez    MRN: [id number]           Patient Information     Date Of Birth          1995        Visit Information        Provider Department      2018 10:40 AM Wesley Mg MD [facility]        Today's Diagnoses     Gender dysphoria in adolescent and adult    -  1       Follow-ups after your visit        Follow-up notes from your care team     Return in about 2 months (around 2018).      Your next 10 appointments already scheduled     2018 11:00 AM CDT   Individual Therapy 53+ minutes with Yvette Myrick PsyD   [facility] ([facility])    [address]  Mail Code 7521  [facility] [address]   [phone]              Who to contact     Please call your clinic at [phone] to:    Ask questions about your health    Make or cancel appointments    Discuss your medicines    Learn about your test results    Speak to your doctor            Additional Information About Your Visit        MyChart Information     [employer] is an electronic gateway that provides easy, online access to your medical records. With [employer], you can request a clinic appointment, read your test results, renew a prescription or communicate with your care team.     To sign up for [employer] visit the website at [email or web address]   You will be asked to enter the access code listed below, as well as some personal information. Please follow the directions to create your username and password.     Your access code is: [email or web address]  Expires: 2018  8:15 AM     Your access code will  in 90 days. If you need help or a new code, please contact your [facility] or call [phone] for assistance.        Care EveryWhere ID     This is your Care EveryWhere ID. This could be used by other organizations to access your [place] medical records  [id number]        Your  "Vitals Were     Pulse Height BMI (Body Mass Index)             79 1.88 m (6' 2\") 33.9 kg/m2          Blood Pressure from Last 3 Encounters:   06/26/18 121/69   06/25/18 130/79   05/14/18 113/70    Weight from Last 3 Encounters:   06/26/18 119.7 kg (264 lb)   06/25/18 108.9 kg (240 lb)   05/14/18 121.6 kg (268 lb)                 Today's Medication Changes          These changes are accurate as of 6/26/18 11:59 PM.  If you have any questions, ask your nurse or doctor.               Start taking these medicines.        Dose/Directions    spironolactone 100 MG tablet   Commonly known as:  ALDACTONE   Used for:  Gender dysphoria in adolescent and adult   Started by:  Wesley Mg MD        Dose:  100 mg   Take 1 tablet (100 mg) by mouth daily   Quantity:  30 tablet   Refills:  1            Where to get your medicines      These medications were sent to [id number] - [place], [place] - [address] AT [place] [address] & [address], [place] [address]     Phone:  [phone]     spironolactone 100 MG tablet                Primary Care Provider Office Phone # Fax #    [facility] [phone] [address] [facility] [address]        Equal Access to Services     AVELINO LAWSON AH: Dorina lro Soania, waaxda luqadaha, qaybta kaalmada adeya, aram eugene. So [facility] [phone].    ATENCIÓN: Si habla español, tiene a wells disposición servicios gratuitos de asistencia lingüística. Efrem al [phone].    We comply with applicable federal civil rights laws and [place] laws. We do not discriminate on the basis of race, color, national origin, age, disability, sex, sexual orientation, or gender identity.            Thank you!     Thank you for choosing [facility]  for your care. Our goal is always to provide you with excellent care. Hearing back from our patients is one way we can continue to " improve our services. Please take a few minutes to complete the written survey that you may receive in the mail after your visit with us. Thank you!             Your Updated Medication List - Protect others around you: Learn how to safely use, store and throw away your medicines at www.disposemymeds.org.          This list is accurate as of 6/26/18 11:59 PM.  Always use your most recent med list.                   Brand Name Dispense Instructions for use Diagnosis    CONCERTA PO      Take 36 mg by mouth daily    Gender dysphoria in adolescent and adult       HYDROcodone-acetaminophen 5-325 MG per tablet    NORCO    10 tablet    Take 1 tablet by mouth every 6 hours as needed for severe pain        SERTRALINE HCL PO      Take 150 mg by mouth daily        spironolactone 100 MG tablet    ALDACTONE    30 tablet    Take 1 tablet (100 mg) by mouth daily    Gender dysphoria in adolescent and adult

## 2018-07-23 ENCOUNTER — OFFICE VISIT (OUTPATIENT)
Dept: OTHER | Facility: OUTPATIENT CENTER | Age: 23
End: 2018-07-23
Payer: COMMERCIAL

## 2018-07-23 DIAGNOSIS — F33.1 MAJOR DEPRESSIVE DISORDER, RECURRENT EPISODE, MODERATE (H): ICD-10-CM

## 2018-07-23 DIAGNOSIS — F64.0 GENDER DYSPHORIA IN ADOLESCENT AND ADULT: Primary | ICD-10-CM

## 2018-07-23 DIAGNOSIS — F41.1 GAD (GENERALIZED ANXIETY DISORDER): ICD-10-CM

## 2018-07-23 NOTE — MR AVS SNAPSHOT
After Visit Summary   2018    Edgard Lopez    MRN: [id number]           Patient Information     Date Of Birth          1995        Visit Information        Provider Department      2018 11:00 AM Yvette Myrick PsyD [facility]        Today's Diagnoses     Gender dysphoria in adolescent and adult    -  1    Major depressive disorder, recurrent episode, moderate (H)        JOSE ANTONIO (generalized anxiety disorder)           Follow-ups after your visit        Who to contact     Please call your clinic at [phone] to:    Ask questions about your health    Make or cancel appointments    Discuss your medicines    Learn about your test results    Speak to your doctor            Additional Information About Your Visit        MyChart Information     [employer] is an electronic gateway that provides easy, online access to your medical records. With [employer], you can request a clinic appointment, read your test results, renew a prescription or communicate with your care team.     To sign up for [employer] visit the website at [email or web address]   You will be asked to enter the access code listed below, as well as some personal information. Please follow the directions to create your username and password.     Your access code is: [email or web address]  Expires: 2018  8:15 AM     Your access code will  in 90 days. If you need help or a new code, please contact your [facility] or call [phone] for assistance.        Care EveryWhere ID     This is your Care EveryWhere ID. This could be used by other organizations to access your [place] medical records  [id number]         Blood Pressure from Last 3 Encounters:   18 121/69   18 130/79   18 113/70    Weight from Last 3 Encounters:   18 119.7 kg (264 lb)   18 108.9 kg (240 lb)   18 121.6 kg (268 lb)              We Performed the Following      Individual Psychotherapy (53+ min) [74075]        Primary Care Provider Office Phone # Fax #    [facility] [phone] [address] [place] [address]        Equal Access to Services     AVELINO LAWSON : Hadkandice elsa morley suha Soania, waaxda luqadaha, qaybta kaalmada adejavieryada, aram herreratonya eugene. So [facility] [phone].    ATENCIÓN: Si habla español, tiene a wells disposición servicios gratuitos de asistencia lingüística. Llame al [phone].    We comply with applicable federal civil rights laws and [place] laws. We do not discriminate on the basis of race, color, national origin, age, disability, sex, sexual orientation, or gender identity.            Thank you!     Thank you for choosing [facility]  for your care. Our goal is always to provide you with excellent care. Hearing back from our patients is one way we can continue to improve our services. Please take a few minutes to complete the written survey that you may receive in the mail after your visit with us. Thank you!             Your Updated Medication List - Protect others around you: Learn how to safely use, store and throw away your medicines at www.disposemymeds.org.          This list is accurate as of 7/23/18 12:04 PM.  Always use your most recent med list.                   Brand Name Dispense Instructions for use Diagnosis    CONCERTA PO      Take 36 mg by mouth daily    Gender dysphoria in adolescent and adult       HYDROcodone-acetaminophen 5-325 MG per tablet    NORCO    10 tablet    Take 1 tablet by mouth every 6 hours as needed for severe pain        SERTRALINE HCL PO      Take 150 mg by mouth daily        spironolactone 100 MG tablet    ALDACTONE    30 tablet    Take 1 tablet (100 mg) by mouth daily    Gender dysphoria in adolescent and adult

## 2018-08-09 ENCOUNTER — OFFICE VISIT (OUTPATIENT)
Dept: OTHER | Facility: OUTPATIENT CENTER | Age: 23
End: 2018-08-09
Payer: COMMERCIAL

## 2018-08-09 VITALS
WEIGHT: 265 LBS | HEIGHT: 74 IN | HEART RATE: 96 BPM | DIASTOLIC BLOOD PRESSURE: 76 MMHG | BODY MASS INDEX: 34.01 KG/M2 | SYSTOLIC BLOOD PRESSURE: 117 MMHG

## 2018-08-09 DIAGNOSIS — F64.0 GENDER DYSPHORIA IN ADOLESCENT AND ADULT: ICD-10-CM

## 2018-08-09 RX ORDER — SPIRONOLACTONE 100 MG/1
100 TABLET, FILM COATED ORAL DAILY
Qty: 30 TABLET | Refills: 1 | Status: SHIPPED | OUTPATIENT
Start: 2018-08-09 | End: 2023-12-18

## 2018-08-09 ASSESSMENT — ENCOUNTER SYMPTOMS
LIGHT-HEADEDNESS: 0
DYSPHORIC MOOD: 0
FREQUENCY: 0
WEAKNESS: 0
NERVOUS/ANXIOUS: 0
CHILLS: 0
NUMBNESS: 0
POLYDIPSIA: 0
CHEST TIGHTNESS: 0
FEVER: 0
PALPITATIONS: 0
VOMITING: 0
ABDOMINAL PAIN: 0
UNEXPECTED WEIGHT CHANGE: 0
HEADACHES: 0
SHORTNESS OF BREATH: 0

## 2018-08-09 NOTE — NURSING NOTE
"Chief Complaint   Patient presents with     RECHECK     TG       Vitals:    08/09/18 1132   BP: 117/76   Pulse: 96   Weight: 120.2 kg (265 lb)   Height: 1.88 m (6' 2\")       Body mass index is 34.02 kg/(m^2).      Yi Castellano CMA    "

## 2018-08-09 NOTE — MR AVS SNAPSHOT
After Visit Summary   2018    Edgard Lopez    MRN: [id number]           Patient Information     Date Of Birth          1995        Visit Information        Provider Department      2018 11:20 AM Wesley Mg MD [facility]        Today's Diagnoses     Gender dysphoria in adolescent and adult          Care Instructions    1. Do lab test at [facility]  2. If lab is not normal, Dr. Mg will call. If normal, she will have her nurse call or will send a letter. Then start taking 2 tablets of spironolactone daily.   3. Do lab test in 1 month after taking 200 mg dose   4. See Dr. Mg in 3 months.          Follow-ups after your visit        Follow-up notes from your care team     Return in about 3 months (around 2018).      Who to contact     Please call your clinic at [phone] to:    Ask questions about your health    Make or cancel appointments    Discuss your medicines    Learn about your test results    Speak to your doctor            Additional Information About Your Visit        [email or web address] Information     [employer] is an electronic gateway that provides easy, online access to your medical records. With [employer], you can request a clinic appointment, read your test results, renew a prescription or communicate with your care team.     To sign up for [employer] visit the website at [email or web address]   You will be asked to enter the access code listed below, as well as some personal information. Please follow the directions to create your username and password.     Your access code is: [email or web address]  Expires: 2018  8:15 AM     Your access code will  in 90 days. If you need help or a new code, please contact your [facility] or call [phone] for assistance.        Care EveryWhere ID     This is your Care EveryWhere ID. This could be used by other organizations to access your [facility]  "records  [id number]        Your Vitals Were     Pulse Height BMI (Body Mass Index)             96 1.88 m (6' 2\") 34.02 kg/m2          Blood Pressure from Last 3 Encounters:   08/09/18 117/76   06/26/18 121/69   06/25/18 130/79    Weight from Last 3 Encounters:   08/09/18 120.2 kg (265 lb)   06/26/18 119.7 kg (264 lb)   06/25/18 108.9 kg (240 lb)              Today, you had the following     No orders found for display         Where to get your medicines      These medications were sent to [id number] - [place], [place] - [address] [facility]  [address], [place] [address]     Phone:  [phone]     spironolactone 100 MG tablet          Primary Care Provider Office Phone # Fax #    [facility] [phone] [address] [facility] [address]        Equal Access to Services     JULIO CESAR LAWSON : Hadii elsa ku hadasho Soomaali, waaxda luqadaha, qaybta kaalmada adeegyada, aram waldron . So [facility] [phone].    ATENCIÓN: Si habla español, tiene a wells disposición servicios gratuitos de asistencia lingüística. [facility] [phone].    We comply with applicable federal civil rights laws and [place] laws. We do not discriminate on the basis of race, color, national origin, age, disability, sex, sexual orientation, or gender identity.            Thank you!     Thank you for choosing [facility]  for your care. Our goal is always to provide you with excellent care. Hearing back from our patients is one way we can continue to improve our services. Please take a few minutes to complete the written survey that you may receive in the mail after your visit with us. Thank you!             Your Updated Medication List - Protect others around you: Learn how to safely use, store and throw away your medicines at www.disposemymeds.org.          This list is accurate as of 8/9/18 11:59 PM.  Always use your " most recent med list.                   Brand Name Dispense Instructions for use Diagnosis    CONCERTA PO      Take 36 mg by mouth daily    Gender dysphoria in adolescent and adult       SERTRALINE HCL PO      Take 150 mg by mouth daily        spironolactone 100 MG tablet    ALDACTONE    30 tablet    Take 1 tablet (100 mg) by mouth daily    Gender dysphoria in adolescent and adult

## 2018-08-09 NOTE — PATIENT INSTRUCTIONS
1. Do lab test at [facility]  2. If lab is not normal, Dr. Mg will call. If normal, she will have her nurse call or will send a letter. Then start taking 2 tablets of spironolactone daily.   3. Do lab test in 1 month after taking 200 mg dose   4. See Dr. Mg in 3 months.

## 2018-08-09 NOTE — PROGRESS NOTES
"       ALEJANDRA Lundberg is a 22 year old individual that uses pronouns She/Her/Hers/Herself that presents today for follow up of:  feminizing hormone therapy.   Gender identity: feminine.   Started Hormone  therapy  6/2018  Continues on .  Spironlactone 100* mg daily      ..   Any special concerns today?    No concerns; tolerating medication     On hormones?  YES +++ Shot day of the week? Not applicable-taking pills/patch/gel      Due for labs?  Yes      +++ Refills of meds needed?  Yes  Gender related body changes since last visit:   Mood--\"total turnaround\" for first time in a long time, just feel good  Less body hair, no change acne.       Breakthrough bleeding? Does Not Apply  Activity: walking and biking  New health concerns since last visit:  none  ---    Past Surgical History:   Procedure Laterality Date     wisdom teeth         Patient Active Problem List   Diagnosis     Gender dysphoria in adolescent and adult     Major depressive disorder without psychotic features     Other specified anxiety disorders     Marijuana abuse     Major depressive disorder, recurrent episode, moderate (H)     JOSE ANTONIO (generalized anxiety disorder)       Current Outpatient Prescriptions   Medication Sig Dispense Refill     Methylphenidate HCl (CONCERTA PO) Take 36 mg by mouth daily       SERTRALINE HCL PO Take 150 mg by mouth daily        spironolactone (ALDACTONE) 100 MG tablet Take 1 tablet (100 mg) by mouth daily 30 tablet 1       History   Smoking Status     Current Every Day Smoker     Packs/day: 0.50     Types: Cigarettes, Cigars, cigarillos or filtered cigars   Smokeless Tobacco     Never Used        No Known Allergies    Health Maintenance Due   Topic Date Due     DEPRESSION ACTION PLAN Q1 YR  08/21/2012     HIV SCREEN (SYSTEM ASSIGNED)  08/21/2012     JOSE ANTONIO QUESTIONNAIRE 3 MONTHS  05/23/2018     PHQ-9 Q3 MONTHS  05/23/2018         Problem, Medication and Allergy Lists were reviewed and are current..         Review of Systems: " "  Review of Systems   Constitutional: Negative for chills, fever and unexpected weight change.   Eyes: Negative for visual disturbance.   Respiratory: Negative for chest tightness and shortness of breath.    Cardiovascular: Negative for chest pain, palpitations and leg swelling.   Gastrointestinal: Negative for abdominal pain and vomiting.   Endocrine: Negative for polydipsia and polyuria.   Genitourinary: Negative for frequency.   Neurological: Negative for weakness, light-headedness, numbness and headaches.   Psychiatric/Behavioral: Negative for dysphoric mood and suicidal ideas. The patient is not nervous/anxious.             Physical Exam:     Vitals:    08/09/18 1132   BP: 117/76   Pulse: 96   Weight: 120.2 kg (265 lb)   Height: 1.88 m (6' 2\")     BMI= Body mass index is 34.02 kg/(m^2).   Wt Readings from Last 10 Encounters:   08/09/18 120.2 kg (265 lb)   06/26/18 119.7 kg (264 lb)   06/25/18 108.9 kg (240 lb)   05/14/18 121.6 kg (268 lb)   01/29/18 124.3 kg (274 lb)     Appearance: Female appearance and dress    GENERAL:: healthy, alert and no distress  RESP: lungs clear to auscultation - no rales, no rhonchi, no wheezes  CV: regular rates and rhythm, normal S1 S2, no S3 or S4 and no murmur, no click or rub -      Affect: Appropriate/mood-congruent           Labs:   Results from last visit:  Orders Only on 05/31/2018   Component Date Value Ref Range Status     Testosterone Total 05/31/2018 528  240 - 950 ng/dL Final    Comment: This test was developed and its performance characteristics determined by the   [facility],  [facility]. It has   not been cleared or approved by the FDA. The laboratory is regulated under   CLIA as qualified to perform high-complexity testing. This test is used for   clinical purposes. It should not be regarded as investigational or for   research.       Sex Hormone Binding Globulin 05/31/2018 25  11 - 80 nmol/L Final     Free Testosterone " Calculated 05/31/2018 12.97  4.7 - 24.4 ng/dL Final     Glucose 05/31/2018 100.0  60.0 - 109.0 mg/dL Final     Urea Nitrogen 05/31/2018 12.0  5.0 - 24.0 mg/dL Final     Calcium 05/31/2018 9.3  8.5 - 10.4 mg/dL Final     Creatinine 05/31/2018 0.7* 0.8 - 1.5 mg/dL Final     eGFR Calculated (Non Black Referen* 05/31/2018 148.5  >60.0 Final     eGFR Calculated (Black Reference) 05/31/2018 179.7  >60.0 Final     Sodium 05/31/2018 140.0  133.0 - 144.0 mmol/L Final     Potassium 05/31/2018 4.1  3.4 - 5.3 mmol/L Final     Chloride 05/31/2018 103.0  94.0 - 109.0 mmol/L Final     Carbon Dioxide 05/31/2018 27.0  20.0 - 32.0 mmol/L Final     Albumin 05/31/2018 4.0  3.3 - 4.6 g/dL Final     Alkaline Phosphatase 05/31/2018 49.0  40.0 - 150.0 U/L Final     ALT 05/31/2018 29.0  0.0 - 70.0 U/L Final     AST 05/31/2018 27.0  0.0 - 55.0 U/L Final     Bilirubin Total 05/31/2018 0.7  0.2 - 1.3 mg/dL Final     Protein Total 05/31/2018 8.0  6.8 - 8.8 g/dL Final     Forgot to do labs    Assessment and Plan   1. Gender dysphoria   Do previously ordered lab test at [place]   If lab is not normal, Dr. Mg will call. If normal, she will have her nurse call or will send a letter. Then start taking 2 tablets of spironolactone daily. (200 mg)  3. If dose increased, will do lab test ( BMP) t in 1 month    Follow up:  Follow up in 3 months.  [facility]  Questions were elicited and answered.     Wesley Mg MD

## 2019-01-25 ENCOUNTER — OFFICE VISIT - HEALTHEAST (OUTPATIENT)
Dept: FAMILY MEDICINE | Facility: CLINIC | Age: 24
End: 2019-01-25

## 2019-01-25 DIAGNOSIS — F32.1 MODERATE MAJOR DEPRESSION (H): ICD-10-CM

## 2019-05-28 ENCOUNTER — COMMUNICATION - HEALTHEAST (OUTPATIENT)
Dept: FAMILY MEDICINE | Facility: CLINIC | Age: 24
End: 2019-05-28

## 2019-05-28 DIAGNOSIS — F32.1 MODERATE MAJOR DEPRESSION (H): ICD-10-CM

## 2019-09-13 ENCOUNTER — COMMUNICATION - HEALTHEAST (OUTPATIENT)
Dept: FAMILY MEDICINE | Facility: CLINIC | Age: 24
End: 2019-09-13

## 2019-09-13 DIAGNOSIS — F32.1 MODERATE MAJOR DEPRESSION (H): ICD-10-CM

## 2019-12-09 ENCOUNTER — OFFICE VISIT - HEALTHEAST (OUTPATIENT)
Dept: FAMILY MEDICINE | Facility: CLINIC | Age: 24
End: 2019-12-09

## 2019-12-09 DIAGNOSIS — M54.50 CHRONIC MIDLINE LOW BACK PAIN, UNSPECIFIED WHETHER SCIATICA PRESENT: ICD-10-CM

## 2019-12-09 DIAGNOSIS — G89.29 CHRONIC MIDLINE LOW BACK PAIN, UNSPECIFIED WHETHER SCIATICA PRESENT: ICD-10-CM

## 2019-12-09 DIAGNOSIS — F32.1 MODERATE MAJOR DEPRESSION (H): ICD-10-CM

## 2019-12-09 ASSESSMENT — MIFFLIN-ST. JEOR: SCORE: 2310.19

## 2019-12-10 ASSESSMENT — ANXIETY QUESTIONNAIRES
6. BECOMING EASILY ANNOYED OR IRRITABLE: SEVERAL DAYS
7. FEELING AFRAID AS IF SOMETHING AWFUL MIGHT HAPPEN: SEVERAL DAYS
5. BEING SO RESTLESS THAT IT IS HARD TO SIT STILL: MORE THAN HALF THE DAYS
GAD7 TOTAL SCORE: 9
3. WORRYING TOO MUCH ABOUT DIFFERENT THINGS: SEVERAL DAYS
4. TROUBLE RELAXING: MORE THAN HALF THE DAYS
1. FEELING NERVOUS, ANXIOUS, OR ON EDGE: SEVERAL DAYS
2. NOT BEING ABLE TO STOP OR CONTROL WORRYING: SEVERAL DAYS
IF YOU CHECKED OFF ANY PROBLEMS ON THIS QUESTIONNAIRE, HOW DIFFICULT HAVE THESE PROBLEMS MADE IT FOR YOU TO DO YOUR WORK, TAKE CARE OF THINGS AT HOME, OR GET ALONG WITH OTHER PEOPLE: SOMEWHAT DIFFICULT

## 2019-12-10 ASSESSMENT — PATIENT HEALTH QUESTIONNAIRE - PHQ9: SUM OF ALL RESPONSES TO PHQ QUESTIONS 1-9: 15

## 2020-03-24 ENCOUNTER — OFFICE VISIT - HEALTHEAST (OUTPATIENT)
Dept: FAMILY MEDICINE | Facility: CLINIC | Age: 25
End: 2020-03-24

## 2020-03-24 ENCOUNTER — RECORDS - HEALTHEAST (OUTPATIENT)
Dept: ADMINISTRATIVE | Facility: OTHER | Age: 25
End: 2020-03-24

## 2020-03-24 DIAGNOSIS — Z87.39 PERSONAL HISTORY OF DISLOCATION OF SHOULDER: ICD-10-CM

## 2020-03-24 DIAGNOSIS — S43.015A: ICD-10-CM

## 2020-03-24 DIAGNOSIS — S42.292A HUMERAL HEAD FRACTURE, LEFT, CLOSED, INITIAL ENCOUNTER: ICD-10-CM

## 2020-03-24 DIAGNOSIS — M25.512 ACUTE PAIN OF LEFT SHOULDER: ICD-10-CM

## 2020-03-31 ENCOUNTER — RECORDS - HEALTHEAST (OUTPATIENT)
Dept: ADMINISTRATIVE | Facility: OTHER | Age: 25
End: 2020-03-31

## 2020-04-22 ENCOUNTER — RECORDS - HEALTHEAST (OUTPATIENT)
Dept: ADMINISTRATIVE | Facility: OTHER | Age: 25
End: 2020-04-22

## 2020-09-15 ENCOUNTER — OFFICE VISIT - HEALTHEAST (OUTPATIENT)
Dept: FAMILY MEDICINE | Facility: CLINIC | Age: 25
End: 2020-09-15

## 2020-09-15 DIAGNOSIS — G43.909 HEMICRANIA: ICD-10-CM

## 2020-09-15 DIAGNOSIS — M72.2 PLANTAR FASCIITIS: ICD-10-CM

## 2020-09-15 ASSESSMENT — MIFFLIN-ST. JEOR: SCORE: 2245.89

## 2021-05-26 ASSESSMENT — PATIENT HEALTH QUESTIONNAIRE - PHQ9: SUM OF ALL RESPONSES TO PHQ QUESTIONS 1-9: 15

## 2021-05-27 VITALS
TEMPERATURE: 98.3 F | OXYGEN SATURATION: 97 % | HEART RATE: 105 BPM | SYSTOLIC BLOOD PRESSURE: 135 MMHG | DIASTOLIC BLOOD PRESSURE: 76 MMHG | RESPIRATION RATE: 16 BRPM

## 2021-05-28 ASSESSMENT — ANXIETY QUESTIONNAIRES: GAD7 TOTAL SCORE: 9

## 2021-05-29 NOTE — TELEPHONE ENCOUNTER
Medication Request  Medication name:   sertraline (ZOLOFT) 100 MG tablet (Discontinued) 90 tablet 2 11/22/2017 1/25/2019 --   Sig - Route: Take 1 tablet (100 mg total) by mouth daily. - Oral   Sent to pharmacy as: sertraline 100 mg tablet       Pharmacy Name and Location: [place] [address]  Reason for request: Out of medication.    Okay to leave a detailed message: yes

## 2021-05-31 ENCOUNTER — RECORDS - HEALTHEAST (OUTPATIENT)
Dept: ADMINISTRATIVE | Facility: CLINIC | Age: 26
End: 2021-05-31

## 2021-05-31 VITALS — BODY MASS INDEX: 36.98 KG/M2 | HEIGHT: 73 IN | WEIGHT: 279.06 LBS

## 2021-05-31 VITALS — HEIGHT: 74 IN | WEIGHT: 283.5 LBS | BODY MASS INDEX: 36.38 KG/M2

## 2021-05-31 VITALS — HEIGHT: 74 IN | WEIGHT: 282 LBS | BODY MASS INDEX: 36.19 KG/M2

## 2021-06-01 VITALS — BODY MASS INDEX: 37.51 KG/M2 | HEIGHT: 73 IN | WEIGHT: 283 LBS

## 2021-06-01 NOTE — TELEPHONE ENCOUNTER
Prescribed zoloft 100mg 1/25/19 and was to follow up in 4 weeks.    No recent follow up.    Not able to fill per RN protocol.    Katalina Lujan, RN, [facility] Nurse Triage/Med Refills RN

## 2021-06-02 VITALS — BODY MASS INDEX: 36.46 KG/M2 | WEIGHT: 284 LBS

## 2021-06-04 VITALS
SYSTOLIC BLOOD PRESSURE: 129 MMHG | DIASTOLIC BLOOD PRESSURE: 69 MMHG | BODY MASS INDEX: 35.41 KG/M2 | HEIGHT: 73 IN | WEIGHT: 267.2 LBS | HEART RATE: 86 BPM

## 2021-06-04 VITALS
RESPIRATION RATE: 16 BRPM | TEMPERATURE: 98.6 F | HEIGHT: 73 IN | HEART RATE: 76 BPM | WEIGHT: 281.38 LBS | BODY MASS INDEX: 37.29 KG/M2 | DIASTOLIC BLOOD PRESSURE: 60 MMHG | SYSTOLIC BLOOD PRESSURE: 122 MMHG

## 2021-06-04 NOTE — PROGRESS NOTES
"Assessment/Plan:    Edgard Lopez is a 24 y.o. male presenting for:    1. Chronic midline low back pain, unspecified whether sciatica present  Referral to physical therapy was placed.  I also did give him information regarding some stretches and exercises that he could do which she will start doing immediately.  He will let me know if he has any further questions or concerns.  - Ambulatory referral to PT/OT    2. Moderate major depression (H)  Refill of Wellbutrin sent to the pharmacy.  He is continuing to work with his therapist/psychiatrist and they will likely be continuing his medications but he is hopeful to get a refill today as he has been unable to get a hold of them.  - buPROPion (WELLBUTRIN XL) 150 MG 24 hr tablet; Take 1 tablet (150 mg total) by mouth every morning.  Dispense: 90 tablet; Refill: 3        Medications Discontinued During This Encounter   Medication Reason     sertraline (ZOLOFT) 100 MG tablet      bupropion HCl (WELLBUTRIN XL ORAL) Reorder     spironolactone (ALDACTONE) 100 MG tablet            Chief Complaint:  Chief Complaint   Patient presents with     Referral     PT for back pain       Subjective:   Edgard Lopez \"Tamika\" is a 24-year-old presenting to the clinic today with concerns over low back pain.  Pain is been going on for quite some time but it is intermittent.  It has been flaring over the last few weeks.  Unclear of source.  Patient thinks it is due to decreased activity.  No trauma that patient can remember.  No pain radiation.  No weakness in legs.  No bowel or bladder involvement.  No fevers.    Patient has a past medical history significant for depression and sees a therapist and a psychiatrist.  Psychiatrist recently started Wellbutrin but patient has been having a difficult time picking this up and is hopeful that I can prescribe.  Wellbutrin is at a dose of 150 mg.        12 point review of systems completed and negative except for what has been described " "above    Social History     Tobacco Use   Smoking Status Current Every Day Smoker     Packs/day: 0.50   Smokeless Tobacco Never Used       Current Outpatient Medications   Medication Sig     buPROPion (WELLBUTRIN XL) 150 MG 24 hr tablet Take 1 tablet (150 mg total) by mouth every morning.         Objective:  Vitals:    12/09/19 1307   BP: 122/60   Pulse: 76   Resp: 16   Temp: 98.6  F (37  C)   TempSrc: Oral   Weight: (!) 281 lb 6 oz (127.6 kg)   Height: 6' 1\" (1.854 m)       Body mass index is 37.12 kg/m .    Vital signs reviewed and stable  General: No acute distress  Psych: Appropriate affect  HEENT: moist mucous membranes  Cardiovascular: regular rate and rhythm with no murmur  Pulmonary: clear to auscultation bilaterally with no wheeze  Abdomen: soft, non tender, non distended with normo-active bowel sounds  Extremities: warm and well perfused with no edema  Skin: warm and dry with no rash  Musculoskeletal: Mild tenderness to palpation in low back paraspinal muscles.  Straight leg raise is negative.  Normal strength and patellar reflexes are normal.       This note has been dictated and transcribed using voice recognition software.   Any errors in transcription are unintentional and inherent to the software.  "

## 2021-06-11 NOTE — PROGRESS NOTES
Assessment/Plan:       1. Hemicrania  Discussed with patient that her pain is not consistent with cluster headache or migraine.  It does have a nerve distribution, cannot rule out trigeminal neuralgia.  Will try indomethacin for a neurogenic headache.  If no improvement after 3 days, could go up to 50 mg 3x daily.  If no improvement after an entire course, consider referral to neurology.  Neuro exam is normal, which is reassuring to the patient.  - indomethacin (INDOCIN) 25 MG capsule; Take 1 capsule (25 mg total) by mouth 3 (three) times a day with meals for 10 days.  Dispense: 30 capsule; Refill: 0    2. Plantar fasciitis  Discussed wearing supportive shoes as often as possible and going barefoot as little as possible.  Follow-up as needed if not improving.        Subjective:       Edgard Lopez is a 25 y.o. transgender male to female who presents for evaluation of headache.  She says that for the past 1 week, she has had pain starting just above the left eye and radiating all the way upward to the occipital scalp.  This feels tender to the touch.  The pain is constant, but waxes and wanes in severity.  Patient reports that this feels worse with movement of the face and eyebrow.  She has never had this before.  She denies blurred vision, balance issues, tinnitus or numbness or tingling in the face or extremities.  She has not been prone to headaches in the past.  She tried taking Tylenol and ibuprofen intermittently without benefit.  She does admit that she has a bit of hypochondria, and is worried essentially that this pain is indicative of a brain tumor.  She has had no exposure to shingles and no skin rash.  She has no eye watering that is unilateral or unilateral sweating.  At the end of the visit, she mentions some pain at the bottoms of the feet.      The following portions of the patient's history were reviewed and updated as appropriate: allergies, current medications, past medical history, past  "social history and problem list.      Current Outpatient Medications:      buPROPion (WELLBUTRIN XL) 150 MG 24 hr tablet, Take 1 tablet (150 mg total) by mouth every morning., Disp: 90 tablet, Rfl: 3     oxyCODONE (ROXICODONE) 5 MG immediate release tablet, Take 1 tablet (5 mg total) by mouth every 6 (six) hours as needed for pain., Disp: 12 tablet, Rfl: 0    Review of Systems   A 12 point comprehensive review of systems was negative except as noted.      Objective:      /69 (Patient Site: Left Arm, Patient Position: Sitting, Cuff Size: Adult Large)   Pulse 86   Ht 6' 1\" (1.854 m)   Wt (!) 267 lb 3.2 oz (121.2 kg)   BMI 35.25 kg/m      General appearance: alert, appears stated age and cooperative  Head: Normocephalic, without obvious abnormality, atraumatic  Eyes: conjunctivae/corneas clear. PERRL, EOM's intact. Fundi benign.  Ears: normal TM's and external ear canals both ears  Lungs: clear to auscultation bilaterally  Heart: regular rate and rhythm, S1, S2 normal, no murmur, click, rub or gallop  Neurologic: Grossly normal  Psychiatric: affect is anxious          "

## 2021-06-12 NOTE — PROGRESS NOTES
Assessment/Plan:    Edgard Lopez is a 22 y.o. male presenting for:    1. Back pain with radiculopathy  Patient most likely has a very mild herniated disc given his radiculopathy.  Because his radiculopathy is above the knee and not worsening with no red flag symptoms I do not think any imaging is indicated at this point.  We will have him use prednisone twice daily for 5 days.  He will also use Flexeril and tramadol as needed.  Note for work was given to release him from work for the rest of this week and put him on somewhat light duty next week.  He will let me know if he needs those restrictions lifted.  Otherwise reassurance was given that this will likely improve on its own over the next 3-4 weeks.  Physical therapy exercises were given as well.  - cyclobenzaprine (FLEXERIL) 5 MG tablet; Take 2 tablets (10 mg total) by mouth every 8 (eight) hours as needed for muscle spasms.  Dispense: 30 tablet; Refill: 1  - predniSONE (DELTASONE) 20 MG tablet; Take 1 tablet (20 mg total) by mouth 2 (two) times a day for 5 days.  Dispense: 10 tablet; Refill: 0  - traMADol (ULTRAM) 50 mg tablet; Take 1 tablet (50 mg total) by mouth every 8 (eight) hours as needed for pain.  Dispense: 30 tablet; Refill: 0    #2.  Anxiety: Patient scored very high on his anxiety score today.  I discussed this with the patient and he will be speaking with his therapist tomorrow regarding medication and will let us know if he is interested in any medication.  He states that he has been on Wellbutrin in the past but thinks that he would maybe want to try different medication.  He does have a history of ADD and was on Concerta in the past as well but does not feel as though he would benefit from that medication as it might even make him more anxious.  We briefly discussed Zoloft today.  Patient does not wish to initiate now but will let myself or Dr. Walters know if he wishes to pursue medication management.    Medications Discontinued During  This Encounter   Medication Reason     ketoconazole (NIZORAL) 2 % cream Therapy completed           Chief Complaint:  Chief Complaint   Patient presents with     Back Pain     Denies injury- started last week- getting worse     Leg Pain     Bilateral     Numbness     L arm and L leg       Subjective:   Edgard Lopez is a pleasant 22-year-old gentleman presenting to the clinic today with concerns over back pain with radiculopathy.  Patient does not have any specific inciting trigger but does note that about 2 weeks ago he began to have some mild low back pain.  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  He believes that this irritated things even more.  This past weekend he began to note very severe bilateral back pain with the left being a bit worse and actually some pain numbness and aching in his left leg.  This will travel down above his left knee.  He has maybe noted some very mild decrease in strength on the left with normal strength on the right.  He has not had any falls or issues in that regard.  He has been taking intermittent ibuprofen which is somewhat transiently helpful.  His father has a history of herniated disks and gave the patient some exercises that he could do.     The patient also wanted to discuss his anxiety today.  The current patient currently sees a therapist and was going to discuss medication with her tomorrow.  He states that he has been on Concerta and Wellbutrin in the past.  His anxiety score today showed almost 3's in every category.  No suicidal or homicidal ideations.        12 point review of systems completed and negative except for what has been described above    History   Smoking Status     Former Smoker   Smokeless Tobacco     Not on file       Current Outpatient Prescriptions   Medication Sig     cyclobenzaprine (FLEXERIL) 5 MG tablet Take 2 tablets (10 mg total) by mouth every 8 (eight) hours as needed for muscle  "spasms.     predniSONE (DELTASONE) 20 MG tablet Take 1 tablet (20 mg total) by mouth 2 (two) times a day for 5 days.     traMADol (ULTRAM) 50 mg tablet Take 1 tablet (50 mg total) by mouth every 8 (eight) hours as needed for pain.         Objective:  Vitals:    09/13/17 1357   BP: 100/58   Pulse: 76   Resp: 16   Temp: 98.3  F (36.8  C)   TempSrc: Oral   Weight: (!) 282 lb (127.9 kg)   Height: 6' 1.5\" (1.867 m)       Vital signs reviewed and stable  General: No acute distress  Psych: Appropriate affect  HEENT: moist mucous membranes, pupils equal, round, reactive to light and accomodation, posterior oropharynx clear of erythema or exudate, tympanic membranes are pearly grey bilaterally  Lymph: no cervical or supraclavicular lymphadenopathy  Cardiovascular: regular rate and rhythm with no murmur  Pulmonary: clear to auscultation bilaterally with no wheeze  Abdomen: soft, non tender, non distended with normo-active bowel sounds  Extremities: warm and well perfused with no edema  Skin: warm and dry with no rash  Musculoskeletal: Tenderness palpation in the left lumbar paraspinal muscles.  Straight leg raise is negative bilaterally.  Patellar reflexes are normal bilaterally.  Intact sensation to light touch In upper legs bilaterally.       This note has been dictated and transcribed using voice recognition software.   Any errors in transcription are unintentional and inherent to the software.    "

## 2021-06-14 NOTE — PROGRESS NOTES
"Assessment/Plan:        Healthcare Maintenance Exam    Fasting labs pending  Immunizations updated  Healthy lifestyle modifications discussed  Discussed self testicular exams  The following are part of a depression follow up plan for the patient:  under care of mental health counselor  The following high BMI interventions were performed this visit: encouragement to exercise and weight monitoring  I have counseled the patient for tobacco cessation and the follow up will occur  at the next visit.      1. Depression  He thinks he would benefit from an increase in the Zoloft and this will be increased to 100 mg daily.  He will continue seeing his counselor and let me know how things go.  - sertraline (ZOLOFT) 100 MG tablet; Take 1 tablet (100 mg total) by mouth daily.  Dispense: 90 tablet; Refill: 2    2.  Transgender  Positive reinforcement was given for the patient to discuss this with me today.  It certainly was obviously difficult thing for him to bring up.  A referral will be placed to the [place] [facility] [place] and he will let me know if there is anything else I can do.  He is hopeful that someone will help make an appointment for him and he states that he could make any day work.       Subjective:      Edgard Lopez is a 22 y.o. male who presents for an annual exam. Concerns are as follows:    Impression: The patient is a past medical history significant for depression.  He sees a counselor weekly.  We recently started him on Zoloft 50 mg daily but he thinks he would benefit from a higher dose.  He seems to be tolerating it well.  No suicidal or homicidal ideations but he continues to feel fairly depressed and somewhat anxious.  He believes much of this is due to the fact that he is transgender.  He recently told his parents about this and he states that \"it did not go well.\"  He states that they do to support him and he feels safe but they were not supportive of his decision to pursue hormonal " treatment.    The patient also will discuss treatment with me today.  He is hopeful to get a referral to a specialist.    Healthy Habits:   Regular Exercise: No  Sunscreen Use: Yes  Healthy Diet: No  Dental Visits Regularly: Yes  Seat Belt: Yes  Sexually active: No  Self Testicular Exam Monthly:Yes  Colonoscopy: N/A  Lipid Profile: N/A  Glucose Screen: N/A      Immunization History   Administered Date(s) Administered     DTaP, historic 1995, 1995, 02/21/1995, 03/18/1996, 09/11/1998     HPV Quadrivalent 08/22/2013, 03/11/2014, 01/05/2016     Hep A, historic 07/01/2010, 08/23/2012     Hep B, historic 1994, 1994, 05/23/1995, 08/22/2013, 03/11/2014     HiB, historic,unspecified 1994, 1994, 02/21/1995, 01/08/1996     IPV 1994, 1994, 03/18/1996, 09/11/1998     MMR 01/08/1996, 07/29/1999     Meningococcal MCV4P 07/01/2010     Tdap 04/05/2006, 01/05/2016     Varicella 09/11/1998, 07/01/2010     Immunization status: up to date and documented.      Current Outpatient Prescriptions   Medication Sig Dispense Refill     sertraline (ZOLOFT) 100 MG tablet Take 1 tablet (100 mg total) by mouth daily. 90 tablet 2     No current facility-administered medications for this visit.      Past Medical History:   Diagnosis Date     Depression      Past Surgical History:   Procedure Laterality Date     WISDOM TOOTH EXTRACTION       Review of patient's allergies indicates no known allergies.  Family History   Problem Relation Age of Onset     Adopted: Yes     Family history unknown: Yes     Social History     Social History     Marital status: Single     Spouse name: N/A     Number of children: N/A     Years of education: N/A     Occupational History     Not on file.     Social History Main Topics     Smoking status: Current Every Day Smoker     Packs/day: 0.50     Smokeless tobacco: Never Used     Alcohol use Yes      Comment: ocassional     Drug use: No     Sexual activity: Not on file  "    Other Topics Concern     Not on file     Social History Narrative       Review of Systems  General:  Denies problems  Eyes:  Denies problems  Ears/Nose/Throat:  Denies problems  Cardiovascular:  Denies problems  Respiratory:  Denies problems  Gastrointestinal:  Denies problems  Genitourinary:  Denies problems  Musculoskeletal:  Denies problems  Skin:  Denies problems  Neurologic:  Denies problems  Psychiatric:  Denies problems  Endocrine:  Denies problems  Heme/Lymphatic:  Denies problems  Allergic/Immunologic:  Denies problems         Objective:         Vitals:    11/22/17 1440   BP: 118/68   Pulse: 68   Resp: 16   Temp: 98.2  F (36.8  C)   TempSrc: Oral   Weight: (!) 279 lb 1 oz (126.6 kg)   Height: 6' 1\" (1.854 m)       Physical Exam:  General Appearance: Alert, cooperative, no distress, appears stated age  Head: Normocephalic, without obvious abnormality, atraumatic  Eyes: PERRL, conjunctiva/corneas clear, EOM's intact  Ears: Normal TM's and external ear canals, both ears   Nose:Nares normal, septum midline,mucosa normal, no drainage   Throat:Lips, mucosa, and tongue normal; teeth and gums normal  Neck: Supple, symmetrical, trachea midline, no adenopathy;  thyroid: not enlarged, symmetric, no tenderness/mass/nodules  Back: Symmetric, no curvature, ROM normal, no CVA tenderness   Lungs: Clear to auscultation bilaterally, respirations unlabored  Chest: no visible abnormalities.  Heart: Regular rate and rhythm, S1 and S2 normal, no murmur, rub, or gallop,   Abdomen: Soft, non-tender, bowel sounds active all four quadrants,  no masses, no organomegaly  : normal appearing penis without lesion, testicular examination is normal with no masses/tenderness  Extremities: Extremities normal, atraumatic, no cyanosis or edema  Skin: Skin color, texture, turgor normal, no rashes or lesions  Lymph nodes: Cervical, supraclavicular, and axillary nodes normal  Neurologic: Normal       "

## 2021-06-16 NOTE — PROGRESS NOTES
"Assessment/Plan:    Edgard Lopez - \"Tamika\" is a 22 y.o. presenting for:    1. Attention deficit disorder (ADD) without hyperactivity  The patient would like to start back on her Concerta medication.  This was sent to the pharmacy.  She would like to start back on his previous dose of 36 mg daily.  If she finds that this dose is too high she will contact me and I can start her on a lower dose and we can wean up.  I had her sign a controlled substance agreement today.  She will follow-up every 6 months for a face-to-face discussion.    2. Depression  The patient feels as though his Zoloft is an adequate dose.    She will continue following up at the [facility] .  She will have her mother come to some of her appointments with the psychologist which she found to be helpful.    There are no discontinued medications.        Chief Complaint:  Chief Complaint   Patient presents with     Medication Education Visit     Med check       Subjective:   \"Tamika\" Jessica is a very pleasant 22-year-old male who is currently seeing the sexual health clinic at the [facility] and involved in a transgender transition.  She identifies as female and prefers the name \"Tamika.\"    She is overall doing very well.  I started her on Zoloft a few months back which she thinks has been helpful.  She actually missed about 5 days at one point and could definitely tell the difference.  She feels as though the dose is at an adequate level.  No suicidal or homicidal ideations.  Her main issues are that her periods are having a difficult time with her transgender transition and this is causing issues at home as she lives with them.    She is hopeful to get back on her Concerta.  She had been on this in the past and done well.  She ended up stopping the medication because she felt some dissociative symptoms but she now understands that that is due to her transgender status.    12 point review of systems " "completed and negative except for what has been described above    History   Smoking Status     Current Every Day Smoker     Packs/day: 0.50   Smokeless Tobacco     Never Used       Current Outpatient Prescriptions   Medication Sig     sertraline (ZOLOFT) 100 MG tablet Take 1 tablet (100 mg total) by mouth daily.     methylphenidate HCl (CONCERTA) 36 MG CR tablet Take 1 tablet (36 mg total) by mouth daily.         Objective:  Vitals:    03/21/18 1124   BP: 128/74   Pulse: 80   Resp: 16   Temp: 97.9  F (36.6  C)   TempSrc: Oral   Weight: (!) 283 lb (128.4 kg)   Height: 6' 1\" (1.854 m)       Vital signs reviewed and stable  General: No acute distress  Psych: Appropriate affect  HEENT: moist mucous membranes, pupils equal, round, reactive to light and accomodation, posterior oropharynx clear of erythema or exudate, tympanic membranes are pearly grey bilaterally  Lymph: no cervical or supraclavicular lymphadenopathy  Cardiovascular: regular rate and rhythm with no murmur  Pulmonary: clear to auscultation bilaterally with no wheeze  Abdomen: soft, non tender, non distended with normo-active bowel sounds  Extremities: warm and well perfused with no edema  Skin: warm and dry with no rash         This note has been dictated and transcribed using voice recognition software.   Any errors in transcription are unintentional and inherent to the software.  "

## 2021-06-18 NOTE — PATIENT INSTRUCTIONS - HE
Patient Instructions by Henny Rodriguez MD at 3/24/2020  7:00 AM     Author: Henny Rodriguez MD Service: -- Author Type: Physician    Filed: 3/24/2020  9:04 AM Encounter Date: 3/24/2020 Status: Addendum    : Henny Rodriguez MD (Physician)    Related Notes: Original Note by Henny Rodriguez MD (Physician) filed at 3/24/2020  9:03 AM       You have a 10:15 am appointment at [facility] at [address] To be seen my the orthopedic surgeon you saw in 2018. Be there at 10 am.   1. Avoid re-injury or any activity which aggravates the pain  2. Avoid any more ibuprofen or aleve until 3/25 because of your Toradol injection  3. Then on 3/25 may alternate ibuprofen every 6 hours with tylenol every 6 hours as needed for pain  3. If follow up is needed, try and be seen at your primary clinic. Or you can be seen by any primary care provider at one of our other [facility] sites  4. Call clinic number with any questions - answered 24/7      Patient Education     Shoulder Fracture  You have a break (fracture) of the shoulder. This may be a small crack in the bone. Or it may be a major break with the broken parts pushed out of position.     If you have only a crack in the bone and no bone fragments are out of place, you will probably be treated with a shoulder immobilizer. This is a special type of sling. Casts are usually not used for this type of fracture. Your bone should heal in 4 to 8 weeks. More serious injuries may need surgery to put the bones back into the correct position for healing.  Home care  Follow these tips to care for yourself at home:    Leave the shoulder immobilizer in place. This will support the injured arm at your side. This is the best position for the bone to heal.    The shoulder immobilizer is adjustable. If it becomes loose, adjust it so that your forearm is level with the ground (horizontal). Your hand should be level with your elbow.    Apply an ice pack to the injured area  for 20 minutes every 1 to 2 hours the first day. You can make an ice pack by putting ice cubes in a plastic bag. A bag of frozen peas or something similar works well too. Wrap the bag in a towel before putting it on your shoulder. Continue with ice packs 3 to 4 times a day for the next 2 to 3 days. Then use the ice as needed to relieve pain and swelling.    You may take acetaminophen or ibuprofen to relieve pain, unless another pain medicine was prescribed. If you have chronic liver or kidney disease or ever had a stomach ulcer or gastrointestinal bleeding, talk with your doctor before using these medicines.    Dont take the sling off before your next exam unless you were told to do so. Ask if you should move your elbow, wrist, and hand.  Follow-up care  Follow up with your healthcare provider, or as advised.  A shoulder joint will become stiff if left in a sling for too long. Ask your doctor when it is safe to begin range-of-motion exercises.  When to seek medical advice  Call your healthcare provider right away if any of these occur:    Your fingers become swollen, cold, blue, numb, or tingly    Your shoulder or upper arm swells a lot or looks very bruised    The pain in your shoulder gets worse    The splint or immobilizer breaks    You have a fever or chills  Date Last Reviewed: 8/1/2016 2000-2017 [employer]. [address] [place], [place] [id number]. All rights reserved. This information is not intended as a substitute for professional medical care. Always follow your healthcare professional's instructions.           Patient Education     Dislocations (Shoulder, Knee Cap, Elbow, Finger)  A joint is the place where your bones come together. Normally, bones glide smoothly within your joints, allowing a wide range of motion. But a bone can be pushed or pulled out of position. This is known as a dislocation. Dislocation prevents normal joint movement and can be very painful. Prompt treatment is  crucial.  Causes of dislocations  Dislocations can happen to almost any joint. But they're most common in the shoulder, knee cap, elbow, and finger. Dislocated elbows happen most often in children. Many dislocations result from injury, such as a blow or fall. But some can happen during normal activities. A shoulder can dislocate during the act of throwing a ball.  When to go to the emergency room (ER)  A dislocation needs emergency care. Injuries that aren`t treated promptly take longer to heal and may result in lasting damage to the joint. Seek medical help right away if you:    Have severe pain in a joint    Can't move the joint normally    Can see the misplaced bone    Have numbness or tingling    Have a break in the skin over the painful joint  What to expect in the ER    You will be given pain medicine to make you more comfortable.    The joint will be examined and an X-ray may be taken to check for fractures or other injuries.    To restore normal alignment, the joint is gently and slowly maneuvered back into proper position. You will receive pain medicine before this procedure.    A dislocated finger or elbow may be splinted to keep it from moving while it heals. An injured shoulder may be placed in a sling.    A second X-ray may be done before you leave the hospital.    In most cases, you will be referred to a bone specialist (orthopedist) or a primary care sports medicine healthcare provider for follow up evaluation and treatment.  Reduce swelling and pain due to a dislocation  Tips to reduce swelling and pain:     Apply ice to the joint (keep a thin cloth between the ice and your skin).    Raise the injured area above heart level if you can.  Date Last Reviewed: 5/1/2018 2000-2019 [employer]. [address] [place] [id number]. All rights reserved. This information is not intended as a substitute for professional medical care. Always follow your healthcare professional's  instructions.

## 2021-06-19 ENCOUNTER — HEALTH MAINTENANCE LETTER (OUTPATIENT)
Age: 26
End: 2021-06-19

## 2021-06-20 NOTE — LETTER
Letter by Henny Rodriguez MD at      Author: Henny Rodriguez MD Service: -- Author Type: --    Filed:  Encounter Date: 3/24/2020 Status: (Other)         March 24, 2020     Patient: Edgard Lopez   YOB: 1995   Date of Visit: 3/24/2020       To Whom it May Concern:    Edgard Lopez was seen in my clinic on 3/24/2020.    He may return to work on 3/30/2020.     If you have any questions or concerns, please don't hesitate to call.    Sincerely,         Electronically signed by Henny Rodriguez MD

## 2021-06-23 NOTE — PROGRESS NOTES
"Assessment/Plan:    Edgard Lopez is a 23 y.o. male presenting for:    1. Moderate major depression (H)  PHQ 9 is positive for threes in every category.  The patient states that he does have suicidal thoughts but does not have a plan or does not feels though he would actually hurt himself.  He does have crisis hotline numbers at home which he could use if needed.  Otherwise Zoloft will be sent to the pharmacy and he will do a quick taper to 100 mg which is what he was on previously.  Referral to psychiatry was placed as well.  He will speak with his therapist to see if she has a psychiatrist whom she works closely with.  - sertraline (ZOLOFT) 50 MG tablet; Take 1 tablet (50 mg total) by mouth daily for 14 days, THEN 2 tablets (100 mg total) daily for 16 days.  Dispense: 60 tablet; Refill: 2  - Ambulatory referral to Psychiatry        Medications Discontinued During This Encounter   Medication Reason     methylphenidate HCl (CONCERTA) 36 MG CR tablet Therapy completed     sertraline (ZOLOFT) 100 MG tablet Therapy completed           Chief Complaint:  Chief Complaint   Patient presents with     medication check       Subjective:   Edgard Lopez is a pleasant 23-year-old gentleman presenting to the clinic today with his father for concerns over depression.  Patient has a past medical history significant for depression and was previously on Zoloft.  He had been taking this medication for quite some time but then stopped taking it about 4 months ago.  He states that he just \"did not  the refill.\"  While he was on the medication he thinks that it did make a difference.  He now is feeling very poorly being off of the medication.  He does have a therapist whom he had not been seeing for quite some time but did go back and see yesterday which he felt helped.  He states that he had does have suicidal thoughts every day however does not have a plan and does not think he would act upon those thoughts.  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  Otherwise, he does not states he does not really leave the house.  He does have some anxiety as well.    He felt as though Zoloft worked well for him.  He was on Wellbutrin in the past but it did not seem to help.  He would like a refill of the Zoloft and would like a referral to see the psychiatrist.    12 point review of systems completed and negative except for what has been described above    Social History     Tobacco Use   Smoking Status Current Every Day Smoker     Packs/day: 0.50   Smokeless Tobacco Never Used       Current Outpatient Medications   Medication Sig     sertraline (ZOLOFT) 50 MG tablet Take 1 tablet (50 mg total) by mouth daily for 14 days, THEN 2 tablets (100 mg total) daily for 16 days.         Objective:  Vitals:    01/25/19 1301   BP: 118/76   Pulse: 100   Resp: 20   Temp: 98.8  F (37.1  C)   TempSrc: Oral   Weight: (!) 284 lb (128.8 kg)       Body mass index is 37.47 kg/m .    Vital signs reviewed and stable  General: No acute distress  Psych: Flat affect, answers questions appropriately  HEENT: moist mucous membranes  Extremities: warm and well perfused with no edema  Skin: warm and dry with no rash         This note has been dictated and transcribed using voice recognition software.   Any errors in transcription are unintentional and inherent to the software.

## 2021-06-28 NOTE — PROGRESS NOTES
Progress Notes by Henny Rodriguez MD at 3/24/2020  7:00 AM     Author: Henny Rodriguez MD Service: -- Author Type: Physician    Filed: 3/24/2020  9:04 AM Encounter Date: 3/24/2020 Status: Signed    : Henny Rodriguez MD (Physician)         Subjective:   Edgard Lopez is a 24 y.o. male  Roomed by: EUGENIA Forte    Refills needed? No    Do you have any forms that need to be filled out? No      Chief Complaint   Patient presents with   ? Shoulder Pain     numb from shoulder to elbow, Rt arm, woke up this morning with the pain   Says about a year ago injured his left shoulder after falling down about 10 stairs. He had a dislocation, but no fractures. Denies any recent injury. Says he woke up around 2 am with left shoulder numbness and pain. He is right handed. Says says he can't move his left shoulder. Says he can move his wrist, fingers and elbow, but not his left shoulder. He is working at [employer].   Review of Systems  See HPI for ROS, otherwise balance of other systems negative    No Known Allergies    Current Outpatient Medications:   ?  buPROPion (WELLBUTRIN XL) 150 MG 24 hr tablet, Take 1 tablet (150 mg total) by mouth every morning., Disp: 90 tablet, Rfl: 3  Patient Active Problem List   Diagnosis   ? Obesity   ? Nicotine Dependence   ? Anxiety   ? ADHD, Predominantly Inattentive Type   ? Hyperlipidemia   ? Depression     Past Medical History:   Diagnosis Date   ? Depression     - if none on file, see Problem List    Objective:     Vitals:    03/24/20 0712   BP: 135/76   Pulse: (!) 105   Resp: 16   Temp: 98.3  F (36.8  C)   TempSrc: Oral   SpO2: 97%   Gen - Pt looks like he is in pain, but not distress  Musculoskeletal-  Right shoulder - no swelling or deformity-full range of motion -  non TTP  Left shoulder  - no swelling or deformity - unable to do any active ROM and had a lot of pain with any passive abduction; +TTP   Right elbow-no erythema, swelling or deformity; full range of motion  Right  forearm-no erythama, swelling or deformity; intact radial pulse  Left elbow - no erythema, swelling or deformity; full range of motion  Left forearm - no erythema, swelling or deformity; intact radial pulse  Left wrist - no swelling, redness or deformity - FROM, normal radial pulse  Right wrist - no swelling, redness or deformity - FROM, normal radial pulse      Xr Shoulder Left 2 Or More Vws    Result Date: 3/24/2020  EXAM DATE:         03/24/2020 EXAM: X-RAY SHOULDER LEFT, MINIMUM 2 VIEWS LOCATION: [facility] DATE/TIME: 3/24/2020 7:45 AM INDICATION: Woke up at 2 AM with acute left shoulder pain.  Decreased range of motion.  History of left shoulder dislocation secondary to an injury 1 year ago COMPARISON: None. IMPRESSION: Anterior, subcoracoid dislocation of the humeral head. There is likely a small Hill-Sachs impaction fracture in the humeral head.   Radiologist's report discussed with patient on day of visit.     Assessment - Plan   Medical Decision Making -24-year-old man presents with acute left shoulder pain since 2 AM this morning.  Denies recent injury but admits an injury a couple of years ago in which he dislocated his left shoulder.  On exam he is afebrile and vital signs are stable with a slightly elevated heart rate.  Patient does look like he is in acute pain but not in distress.  His left upper extremity exam was within normal limits except for significant decreased range of motion of his left shoulder and increasing pain with flexion and extension of the left elbow.  X-ray noted a anterior subcoracoid dislocation of the humeral head with likely a small Hill-Sachs impaction fracture of the humeral head.  Phoned [facility] and was able to arrange an appointment for patient to see the orthopedic surgeon that he had seen in 2018-10-15 this morning.  Patient was given an injection of ketorolac in clinic, and a prescription for oxycodone was written for 12 tabs.  Patient will  follow-up here as needed for symptoms not related to his shoulder.  See patient instructions.    1. Anterior dislocation of humerus, closed, left, initial encounter  - oxyCODONE (ROXICODONE) 5 MG immediate release tablet; Take 1 tablet (5 mg total) by mouth every 6 (six) hours as needed for pain.  Dispense: 12 tablet; Refill: 0    2. Humeral head fracture, left, closed, initial encounter  - oxyCODONE (ROXICODONE) 5 MG immediate release tablet; Take 1 tablet (5 mg total) by mouth every 6 (six) hours as needed for pain.  Dispense: 12 tablet; Refill: 0    3. Acute pain of left shoulder  - ketorolac injection 30 mg (TORADOL)  - XR Shoulder Left 2 or More VWS; Future  - XR Shoulder Left 2 or More VWS    4. Personal history of dislocation of shoulder    At the conclusion of the encounter, assessment and plan were discussed.   All questions were answered.   The patient or guardian acknowledged understanding and was involved in the decision making regarding the overall care plan.    Patient Instructions   You have a 10:15 am appointment at [facility] at [address] To be seen my the orthopedic surgeon you saw in 2018. Be there at 10 am.   1. Avoid re-injury or any activity which aggravates the pain  2. Avoid any more ibuprofen or aleve until 3/25 because of your Toradol injection  3. Then on 3/25 may alternate ibuprofen every 6 hours with tylenol every 6 hours as needed for pain  3. If follow up is needed, try and be seen at your primary clinic. Or you can be seen by any primary care provider at one of our other [facility] sites  4. Call clinic number with any questions - answered 24/7      Patient Education     Shoulder Fracture  You have a break (fracture) of the shoulder. This may be a small crack in the bone. Or it may be a major break with the broken parts pushed out of position.     If you have only a crack in the bone and no bone fragments are out of place, you will probably be treated with a  shoulder immobilizer. This is a special type of sling. Casts are usually not used for this type of fracture. Your bone should heal in 4 to 8 weeks. More serious injuries may need surgery to put the bones back into the correct position for healing.  Home care  Follow these tips to care for yourself at home:    Leave the shoulder immobilizer in place. This will support the injured arm at your side. This is the best position for the bone to heal.    The shoulder immobilizer is adjustable. If it becomes loose, adjust it so that your forearm is level with the ground (horizontal). Your hand should be level with your elbow.    Apply an ice pack to the injured area for 20 minutes every 1 to 2 hours the first day. You can make an ice pack by putting ice cubes in a plastic bag. A bag of frozen peas or something similar works well too. Wrap the bag in a towel before putting it on your shoulder. Continue with ice packs 3 to 4 times a day for the next 2 to 3 days. Then use the ice as needed to relieve pain and swelling.    You may take acetaminophen or ibuprofen to relieve pain, unless another pain medicine was prescribed. If you have chronic liver or kidney disease or ever had a stomach ulcer or gastrointestinal bleeding, talk with your doctor before using these medicines.    Dont take the sling off before your next exam unless you were told to do so. Ask if you should move your elbow, wrist, and hand.  Follow-up care  Follow up with your healthcare provider, or as advised.  A shoulder joint will become stiff if left in a sling for too long. Ask your doctor when it is safe to begin range-of-motion exercises.  When to seek medical advice  Call your healthcare provider right away if any of these occur:    Your fingers become swollen, cold, blue, numb, or tingly    Your shoulder or upper arm swells a lot or looks very bruised    The pain in your shoulder gets worse    The splint or immobilizer breaks    You have a fever or  nathalie  Date Last Reviewed: 8/1/2016 2000-2017 [employer]. [address] [place] [address], [place] [address]. All rights reserved. This information is not intended as a substitute for professional medical care. Always follow your healthcare professional's instructions.           Patient Education     Dislocations (Shoulder, Knee Cap, Elbow, Finger)  A joint is the place where your bones come together. Normally, bones glide smoothly within your joints, allowing a wide range of motion. But a bone can be pushed or pulled out of position. This is known as a dislocation. Dislocation prevents normal joint movement and can be very painful. Prompt treatment is crucial.  Causes of dislocations  Dislocations can happen to almost any joint. But they're most common in the shoulder, knee cap, elbow, and finger. Dislocated elbows happen most often in children. Many dislocations result from injury, such as a blow or fall. But some can happen during normal activities. A shoulder can dislocate during the act of throwing a ball.  When to go to the emergency room (ER)  A dislocation needs emergency care. Injuries that aren`t treated promptly take longer to heal and may result in lasting damage to the joint. Seek medical help right away if you:    Have severe pain in a joint    Can't move the joint normally    Can see the misplaced bone    Have numbness or tingling    Have a break in the skin over the painful joint  What to expect in the ER    You will be given pain medicine to make you more comfortable.    The joint will be examined and an X-ray may be taken to check for fractures or other injuries.    To restore normal alignment, the joint is gently and slowly maneuvered back into proper position. You will receive pain medicine before this procedure.    A dislocated finger or elbow may be splinted to keep it from moving while it heals. An injured shoulder may be placed in a sling.    A second X-ray may be done before you  leave the hospital.    In most cases, you will be referred to a bone specialist (orthopedist) or a primary care sports medicine healthcare provider for follow up evaluation and treatment.  Reduce swelling and pain due to a dislocation  Tips to reduce swelling and pain:     Apply ice to the joint (keep a thin cloth between the ice and your skin).    Raise the injured area above heart level if you can.  Date Last Reviewed: 5/1/2018 2000-2019 [employer]. [address] [place], [place] [id number]. All rights reserved. This information is not intended as a substitute for professional medical care. Always follow your healthcare professional's instructions.

## 2021-10-09 ENCOUNTER — HEALTH MAINTENANCE LETTER (OUTPATIENT)
Age: 26
End: 2021-10-09

## 2022-07-16 ENCOUNTER — HEALTH MAINTENANCE LETTER (OUTPATIENT)
Age: 27
End: 2022-07-16

## 2022-09-17 ENCOUNTER — HEALTH MAINTENANCE LETTER (OUTPATIENT)
Age: 27
End: 2022-09-17

## 2023-07-29 ENCOUNTER — HEALTH MAINTENANCE LETTER (OUTPATIENT)
Age: 28
End: 2023-07-29

## 2023-08-29 ENCOUNTER — IMMUNIZATION (OUTPATIENT)
Dept: FAMILY MEDICINE | Facility: CLINIC | Age: 28
End: 2023-08-29
Payer: COMMERCIAL

## 2023-08-29 DIAGNOSIS — Z23 NEED FOR TDAP VACCINATION: ICD-10-CM

## 2023-08-29 DIAGNOSIS — Z23 ENCOUNTER FOR IMMUNIZATION: Primary | ICD-10-CM

## 2023-08-29 PROCEDURE — 90471 IMMUNIZATION ADMIN: CPT

## 2023-08-29 PROCEDURE — 91312 COVID-19 BIVALENT 12+ (PFIZER): CPT

## 2023-08-29 PROCEDURE — 0121A COVID-19 BIVALENT 12+ (PFIZER): CPT

## 2023-08-29 PROCEDURE — 90715 TDAP VACCINE 7 YRS/> IM: CPT

## 2023-08-29 NOTE — PROGRESS NOTES
Prior to immunization administration, verified patients identity using patient s name and date of birth. Please see Immunization Activity for additional information.     Screening Questionnaire for Adult Immunization    Are you sick today?   No   Do you have allergies to medications, food, a vaccine component or latex?   No   Have you ever had a serious reaction after receiving a vaccination?   No   Do you have a long-term health problem with heart, lung, kidney, or metabolic disease (e.g., diabetes), asthma, a blood disorder, no spleen, complement component deficiency, a cochlear implant, or a spinal fluid leak?  Are you on long-term aspirin therapy?   No   Do you have cancer, leukemia, HIV/AIDS, or any other immune system problem?   No   Do you have a parent, brother, or sister with an immune system problem?   No   In the past 3 months, have you taken medications that affect  your immune system, such as prednisone, other steroids, or anticancer drugs; drugs for the treatment of rheumatoid arthritis, Crohn s disease, or psoriasis; or have you had radiation treatments?   No   Have you had a seizure, or a brain or other nervous system problem?   No   During the past year, have you received a transfusion of blood or blood    products, or been given immune (gamma) globulin or antiviral drug?   No   For women: Are you pregnant or is there a chance you could become       pregnant during the next month?   No   Have you received any vaccinations in the past 4 weeks?   No     Immunization questionnaire answers were all negative.    Patient instructed to remain in clinic for 15 minutes afterwards, and to report any adverse reactions.     Screening performed by Priscilla Granado on 8/29/2023 at 2:14 PM.

## 2023-12-12 PROBLEM — F17.200 NICOTINE DEPENDENCE: Status: ACTIVE | Noted: 2023-12-12

## 2023-12-12 PROBLEM — F90.0 ADHD, PREDOMINANTLY INATTENTIVE TYPE: Status: ACTIVE | Noted: 2023-12-12

## 2023-12-12 PROBLEM — E66.9 OBESITY: Status: ACTIVE | Noted: 2023-12-12

## 2023-12-12 PROBLEM — E78.5 HYPERLIPIDEMIA: Status: ACTIVE | Noted: 2023-12-12

## 2023-12-12 RX ORDER — ESCITALOPRAM OXALATE 20 MG/1
TABLET ORAL
COMMUNITY
Start: 2023-08-23

## 2023-12-12 RX ORDER — BUPROPION HYDROCHLORIDE 300 MG/1
300 TABLET ORAL
COMMUNITY
Start: 2023-08-23

## 2023-12-18 ENCOUNTER — OFFICE VISIT (OUTPATIENT)
Dept: FAMILY MEDICINE | Facility: CLINIC | Age: 28
End: 2023-12-18
Payer: COMMERCIAL

## 2023-12-18 VITALS
DIASTOLIC BLOOD PRESSURE: 78 MMHG | HEART RATE: 78 BPM | HEIGHT: 73 IN | WEIGHT: 261.25 LBS | RESPIRATION RATE: 16 BRPM | OXYGEN SATURATION: 97 % | BODY MASS INDEX: 34.62 KG/M2 | SYSTOLIC BLOOD PRESSURE: 122 MMHG | TEMPERATURE: 97.9 F

## 2023-12-18 DIAGNOSIS — Z00.00 HEALTHCARE MAINTENANCE: ICD-10-CM

## 2023-12-18 DIAGNOSIS — F64.0 TRANSSEXUALISM: Primary | ICD-10-CM

## 2023-12-18 PROCEDURE — 90480 ADMN SARSCOV2 VAC 1/ONLY CMP: CPT | Performed by: FAMILY MEDICINE

## 2023-12-18 PROCEDURE — 99385 PREV VISIT NEW AGE 18-39: CPT | Mod: 25 | Performed by: FAMILY MEDICINE

## 2023-12-18 PROCEDURE — 90686 IIV4 VACC NO PRSV 0.5 ML IM: CPT | Performed by: FAMILY MEDICINE

## 2023-12-18 PROCEDURE — 90471 IMMUNIZATION ADMIN: CPT | Performed by: FAMILY MEDICINE

## 2023-12-18 PROCEDURE — 91320 SARSCV2 VAC 30MCG TRS-SUC IM: CPT | Performed by: FAMILY MEDICINE

## 2023-12-18 RX ORDER — NEEDLES, DISPOSABLE 25GX5/8"
NEEDLE, DISPOSABLE MISCELLANEOUS
COMMUNITY
Start: 2023-11-01 | End: 2023-12-18

## 2023-12-18 RX ORDER — NEEDLES, DISPOSABLE 25GX5/8"
NEEDLE, DISPOSABLE MISCELLANEOUS
Qty: 50 EACH | Refills: 3 | Status: SHIPPED | OUTPATIENT
Start: 2023-12-18

## 2023-12-18 RX ORDER — ESTRADIOL VALERATE 20 MG/ML
INJECTION INTRAMUSCULAR WEEKLY
COMMUNITY
Start: 2023-10-31

## 2023-12-18 RX ORDER — NEEDLES, DISPOSABLE 25GX5/8"
NEEDLE, DISPOSABLE MISCELLANEOUS
COMMUNITY
Start: 2023-10-30 | End: 2023-12-18

## 2023-12-18 RX ORDER — SYRINGE, DISPOSABLE, 1 ML
SYRINGE, EMPTY DISPOSABLE MISCELLANEOUS SEE ADMIN INSTRUCTIONS
COMMUNITY
Start: 2023-10-30 | End: 2024-01-22

## 2023-12-18 ASSESSMENT — ENCOUNTER SYMPTOMS
DIZZINESS: 0
FEVER: 0
PARESTHESIAS: 0
HEMATOCHEZIA: 0
CHILLS: 0
FREQUENCY: 0
EYE PAIN: 0
ARTHRALGIAS: 0
DYSURIA: 0
NERVOUS/ANXIOUS: 0
HEARTBURN: 0
SORE THROAT: 0
NAUSEA: 0
CONSTIPATION: 1
DIARRHEA: 0
MYALGIAS: 0
WEAKNESS: 0
PALPITATIONS: 0
HEMATURIA: 0
JOINT SWELLING: 0
ABDOMINAL PAIN: 0
BREAST MASS: 0
COUGH: 0
HEADACHES: 0
SHORTNESS OF BREATH: 0

## 2023-12-18 ASSESSMENT — ANXIETY QUESTIONNAIRES
5. BEING SO RESTLESS THAT IT IS HARD TO SIT STILL: MORE THAN HALF THE DAYS
6. BECOMING EASILY ANNOYED OR IRRITABLE: SEVERAL DAYS
2. NOT BEING ABLE TO STOP OR CONTROL WORRYING: MORE THAN HALF THE DAYS
GAD7 TOTAL SCORE: 9
GAD7 TOTAL SCORE: 9
4. TROUBLE RELAXING: SEVERAL DAYS
1. FEELING NERVOUS, ANXIOUS, OR ON EDGE: SEVERAL DAYS
7. FEELING AFRAID AS IF SOMETHING AWFUL MIGHT HAPPEN: SEVERAL DAYS
IF YOU CHECKED OFF ANY PROBLEMS ON THIS QUESTIONNAIRE, HOW DIFFICULT HAVE THESE PROBLEMS MADE IT FOR YOU TO DO YOUR WORK, TAKE CARE OF THINGS AT HOME, OR GET ALONG WITH OTHER PEOPLE: SOMEWHAT DIFFICULT
3. WORRYING TOO MUCH ABOUT DIFFERENT THINGS: SEVERAL DAYS

## 2023-12-18 ASSESSMENT — PATIENT HEALTH QUESTIONNAIRE - PHQ9
SUM OF ALL RESPONSES TO PHQ QUESTIONS 1-9: 7
SUM OF ALL RESPONSES TO PHQ QUESTIONS 1-9: 7
10. IF YOU CHECKED OFF ANY PROBLEMS, HOW DIFFICULT HAVE THESE PROBLEMS MADE IT FOR YOU TO DO YOUR WORK, TAKE CARE OF THINGS AT HOME, OR GET ALONG WITH OTHER PEOPLE: SOMEWHAT DIFFICULT

## 2023-12-18 NOTE — PROGRESS NOTES
"SUBJECTIVE:   Tamika is a 28 year old, presenting for the following:  Physical and Ear Problem (R sided ear pain/discomfort x 2-3 months ago- still having a little bit of issues)    Healthy Habits:     Getting at least 3 servings of Calcium per day:  Yes    Bi-annual eye exam:  NO    Dental care twice a year:  Yes    Sleep apnea or symptoms of sleep apnea:  Daytime drowsiness    Diet:  Regular (no restrictions)    Frequency of exercise:  None    Taking medications regularly:  Yes    Medication side effects:  None    Additional concerns today:  No  Ear Problem  Pertinent negatives include no headaches, no hearing loss, no sore throat, no abdominal pain, no diarrhea, no cough and no rash.       Today's PHQ-9 Score:       12/18/2023     2:09 PM   PHQ-9 SCORE   PHQ-9 Total Score MyChart 7 (Mild depression)   PHQ-9 Total Score 7     Tamika is a very pleasant 28-year-old female with a past significant for anxiety, ADHD, depression and transgender on therapy.    She is overall doing well.  She is no specific questions or concerns.  Ear pain that was fairly severe a few weeks ago but has waned.  It is still slightly bothersome occasionally but improving.    [facility].    Have you ever done Advance Care Planning? (For example, a Health Directive, POLST, or a discussion with a medical provider or your loved ones about your wishes): No, advance care planning information given to patient to review.  Patient plans to discuss their wishes with loved ones or provider.      Social History     Tobacco Use    Smoking status: Every Day     Packs/day: .5     Types: Cigarettes, Cigars, cigarillos or filtered cigars    Smokeless tobacco: Never   Substance Use Topics    Alcohol use: Yes     Comment: Alcoholic Drinks/day: ocassional             12/18/2023     2:12 PM   Alcohol Use   Prescreen: >3 drinks/day or >7 drinks/week? No       Last PSA: No results found for: \"PSA\"    Reviewed orders with " "patient. Reviewed health maintenance and updated orders accordingly - Yes  Lab work is in process    Reviewed and updated as needed this visit by clinical staff    Allergies  Meds              Reviewed and updated as needed this visit by Provider                     Review of Systems   Constitutional:  Negative for chills and fever.   HENT:  Positive for ear pain. Negative for congestion, hearing loss and sore throat.    Eyes:  Negative for pain and visual disturbance.   Respiratory:  Negative for cough and shortness of breath.    Cardiovascular:  Negative for chest pain and palpitations.   Gastrointestinal:  Positive for constipation. Negative for abdominal pain, diarrhea and nausea.   Genitourinary:  Negative for dysuria, frequency, genital sores, hematuria and urgency.   Musculoskeletal:  Negative for arthralgias, joint swelling and myalgias.   Skin:  Negative for rash.   Neurological:  Negative for dizziness, weakness and headaches.   Psychiatric/Behavioral:  The patient is not nervous/anxious.          OBJECTIVE:   /78   Pulse 78   Temp 97.9  F (36.6  C) (Tympanic)   Resp 16   Ht 1.848 m (6' 0.75\")   Wt 118.5 kg (261 lb 4 oz)   SpO2 97%   BMI 34.71 kg/m      Physical Exam  Objective:  Vital signs reviewed and stable  General: No acute distress  Psych: Appropriate affect  HEENT: moist mucous membranes, pupils equal, round, reactive to light and accomodation, tympanic membranes are pearly grey bilaterally  Lymph: no cervical or supraclavicular lymphadenopathy  Cardiovascular: regular rate and rhythm with no murmur  Pulmonary: clear to auscultation bilaterally with no wheeze  Abdomen: soft, non tender, non distended with normo-active bowel sounds  Extremities: warm and well perfused with no edema  Skin: warm and dry with no rash      Diagnostic Test Results:  Labs reviewed in Epic    ASSESSMENT/PLAN:   1. Gender dysphoria in adolescent and adult  [facility] " "[facility]  - BD HYPODERMIC NEEDLE 25G X 1-1/2\" MISC; USE AS DIRECTED FOR SELF-INJECTION OF MEDICATION weekly  Dispense: 50 each; Refill: 3  - BD HYPODERMIC NEEDLE 18G X 1\" MISC; USE AS DIRECTED FOR DRAWING UP MEDICATION weekly  Dispense: 50 each; Refill: 3    2. Healthcare Maintenance    Patient has been advised of split billing requirements and indicates understanding: Yes      COUNSELING:   Reviewed preventive health counseling, as reflected in patient instructions      BMI:   Estimated body mass index is 34.71 kg/m  as calculated from the following:    Height as of this encounter: 1.848 m (6' 0.75\").    Weight as of this encounter: 118.5 kg (261 lb 4 oz).   Weight management plan: Discussed healthy diet and exercise guidelines      She reports that she has been smoking cigarettes and cigars, cigarillos or filtered cigars. She has been smoking an average of .5 packs per day. She has never used smokeless tobacco.  Nicotine/Tobacco Cessation Plan:   Information offered: Patient not interested at this time            Priscilla Dorado MD  [facility]  "

## 2023-12-30 ENCOUNTER — MYC REFILL (OUTPATIENT)
Dept: FAMILY MEDICINE | Facility: CLINIC | Age: 28
End: 2023-12-30
Payer: COMMERCIAL

## 2023-12-30 RX ORDER — ESTRADIOL VALERATE 20 MG/ML
INJECTION INTRAMUSCULAR WEEKLY
Status: CANCELLED | OUTPATIENT
Start: 2023-12-30

## 2024-02-21 ENCOUNTER — TELEPHONE (OUTPATIENT)
Dept: FAMILY MEDICINE | Facility: CLINIC | Age: 29
End: 2024-02-21

## 2024-02-21 DIAGNOSIS — Z79.899 HIGH RISK MEDICATION USE: Primary | ICD-10-CM

## 2024-02-21 DIAGNOSIS — F41.1 GENERALIZED ANXIETY DISORDER: ICD-10-CM

## 2024-02-21 DIAGNOSIS — Z11.59 NEED FOR HEPATITIS C SCREENING TEST: ICD-10-CM

## 2024-02-21 DIAGNOSIS — Z11.4 SCREENING FOR HIV (HUMAN IMMUNODEFICIENCY VIRUS): ICD-10-CM

## 2024-02-21 DIAGNOSIS — F33.9 RECURRENT MAJOR DEPRESSION (H): ICD-10-CM

## 2024-02-21 DIAGNOSIS — Z79.899 HIGH RISK MEDICATIONS (NOT ANTICOAGULANTS) LONG-TERM USE: Primary | ICD-10-CM

## 2024-02-21 DIAGNOSIS — E78.5 HYPERLIPIDEMIA: ICD-10-CM

## 2024-02-21 DIAGNOSIS — F90.9 ATTENTION DEFICIT HYPERACTIVITY DISORDER: ICD-10-CM

## 2024-03-13 ENCOUNTER — LAB (OUTPATIENT)
Dept: LAB | Facility: CLINIC | Age: 29
End: 2024-03-13
Payer: COMMERCIAL

## 2024-03-13 DIAGNOSIS — F90.9 ATTENTION DEFICIT HYPERACTIVITY DISORDER: ICD-10-CM

## 2024-03-13 DIAGNOSIS — Z11.59 NEED FOR HEPATITIS C SCREENING TEST: ICD-10-CM

## 2024-03-13 DIAGNOSIS — F41.1 GENERALIZED ANXIETY DISORDER: ICD-10-CM

## 2024-03-13 DIAGNOSIS — Z79.899 HIGH RISK MEDICATIONS (NOT ANTICOAGULANTS) LONG-TERM USE: ICD-10-CM

## 2024-03-13 DIAGNOSIS — Z11.4 SCREENING FOR HIV (HUMAN IMMUNODEFICIENCY VIRUS): ICD-10-CM

## 2024-03-13 DIAGNOSIS — F33.9 RECURRENT MAJOR DEPRESSION (H): ICD-10-CM

## 2024-03-13 DIAGNOSIS — E78.5 HYPERLIPIDEMIA: ICD-10-CM

## 2024-03-13 LAB
ALBUMIN SERPL BCG-MCNC: 4.4 G/DL (ref 3.5–5.2)
ALP SERPL-CCNC: 46 U/L (ref 40–150)
ALT SERPL W P-5'-P-CCNC: 24 U/L (ref 0–70)
AMPHETAMINES UR QL SCN: ABNORMAL
ANION GAP SERPL CALCULATED.3IONS-SCNC: 10 MMOL/L (ref 7–15)
AST SERPL W P-5'-P-CCNC: 23 U/L (ref 0–45)
BARBITURATES UR QL SCN: ABNORMAL
BASOPHILS # BLD AUTO: 0 10E3/UL (ref 0–0.2)
BASOPHILS NFR BLD AUTO: 0 %
BENZODIAZ UR QL SCN: ABNORMAL
BILIRUB DIRECT SERPL-MCNC: <0.2 MG/DL (ref 0–0.3)
BILIRUB SERPL-MCNC: 0.5 MG/DL
BUN SERPL-MCNC: 17.3 MG/DL (ref 6–20)
BZE UR QL SCN: ABNORMAL
CALCIUM SERPL-MCNC: 9.5 MG/DL (ref 8.6–10)
CANNABINOIDS UR QL SCN: ABNORMAL
CHLORIDE SERPL-SCNC: 100 MMOL/L (ref 98–107)
CHOLEST SERPL-MCNC: 198 MG/DL
CREAT SERPL-MCNC: 0.8 MG/DL (ref 0.51–1.17)
DEPRECATED HCO3 PLAS-SCNC: 26 MMOL/L (ref 22–29)
EGFRCR SERPLBLD CKD-EPI 2021: >90 ML/MIN/1.73M2
EOSINOPHIL # BLD AUTO: 0.3 10E3/UL (ref 0–0.7)
EOSINOPHIL NFR BLD AUTO: 3 %
ERYTHROCYTE [DISTWIDTH] IN BLOOD BY AUTOMATED COUNT: 11.6 % (ref 10–15)
FASTING STATUS PATIENT QL REPORTED: NO
FENTANYL UR QL: ABNORMAL
FERRITIN SERPL-MCNC: 384 NG/ML (ref 6–409)
FOLATE SERPL-MCNC: 8.4 NG/ML (ref 4.6–34.8)
GLUCOSE SERPL-MCNC: 118 MG/DL (ref 70–99)
HBA1C MFR BLD: 4.9 % (ref 0–5.6)
HCT VFR BLD AUTO: 38.1 % (ref 35–53)
HDLC SERPL-MCNC: 40 MG/DL
HGB BLD-MCNC: 13.2 G/DL (ref 11.7–17.7)
IMM GRANULOCYTES # BLD: 0 10E3/UL
IMM GRANULOCYTES NFR BLD: 0 %
IRON SERPL-MCNC: 115 UG/DL (ref 37–157)
LDLC SERPL CALC-MCNC: 123 MG/DL
LYMPHOCYTES # BLD AUTO: 2.7 10E3/UL (ref 0.8–5.3)
LYMPHOCYTES NFR BLD AUTO: 30 %
MAGNESIUM SERPL-MCNC: 1.9 MG/DL (ref 1.7–2.3)
MCH RBC QN AUTO: 32.1 PG (ref 26.5–33)
MCHC RBC AUTO-ENTMCNC: 34.6 G/DL (ref 31.5–36.5)
MCV RBC AUTO: 93 FL (ref 78–100)
MONOCYTES # BLD AUTO: 0.5 10E3/UL (ref 0–1.3)
MONOCYTES NFR BLD AUTO: 5 %
NEUTROPHILS # BLD AUTO: 5.6 10E3/UL (ref 1.6–8.3)
NEUTROPHILS NFR BLD AUTO: 62 %
NONHDLC SERPL-MCNC: 158 MG/DL
OPIATES UR QL SCN: ABNORMAL
PCP QUAL URINE (ROCHE): ABNORMAL
PLATELET # BLD AUTO: 266 10E3/UL (ref 150–450)
POTASSIUM SERPL-SCNC: 3.9 MMOL/L (ref 3.4–5.3)
PROT SERPL-MCNC: 7.2 G/DL (ref 6.4–8.3)
RBC # BLD AUTO: 4.11 10E6/UL (ref 3.8–5.9)
SODIUM SERPL-SCNC: 136 MMOL/L (ref 135–145)
T4 FREE SERPL-MCNC: 1 NG/DL (ref 0.9–1.7)
TRIGL SERPL-MCNC: 177 MG/DL
TSH SERPL DL<=0.005 MIU/L-ACNC: 1.08 UIU/ML (ref 0.3–4.2)
WBC # BLD AUTO: 9.1 10E3/UL (ref 4–11)

## 2024-03-13 PROCEDURE — 82607 VITAMIN B-12: CPT

## 2024-03-13 PROCEDURE — 82746 ASSAY OF FOLIC ACID SERUM: CPT

## 2024-03-13 PROCEDURE — 82248 BILIRUBIN DIRECT: CPT

## 2024-03-13 PROCEDURE — 85025 COMPLETE CBC W/AUTO DIFF WBC: CPT

## 2024-03-13 PROCEDURE — 99000 SPECIMEN HANDLING OFFICE-LAB: CPT

## 2024-03-13 PROCEDURE — 84439 ASSAY OF FREE THYROXINE: CPT

## 2024-03-13 PROCEDURE — 80307 DRUG TEST PRSMV CHEM ANLYZR: CPT

## 2024-03-13 PROCEDURE — 83735 ASSAY OF MAGNESIUM: CPT

## 2024-03-13 PROCEDURE — 84443 ASSAY THYROID STIM HORMONE: CPT

## 2024-03-13 PROCEDURE — 83540 ASSAY OF IRON: CPT

## 2024-03-13 PROCEDURE — 84630 ASSAY OF ZINC: CPT | Mod: 90

## 2024-03-13 PROCEDURE — 36415 COLL VENOUS BLD VENIPUNCTURE: CPT

## 2024-03-13 PROCEDURE — 82306 VITAMIN D 25 HYDROXY: CPT

## 2024-03-13 PROCEDURE — 82728 ASSAY OF FERRITIN: CPT

## 2024-03-13 PROCEDURE — 84481 FREE ASSAY (FT-3): CPT

## 2024-03-13 PROCEDURE — 80053 COMPREHEN METABOLIC PANEL: CPT

## 2024-03-13 PROCEDURE — 86803 HEPATITIS C AB TEST: CPT

## 2024-03-13 PROCEDURE — 87389 HIV-1 AG W/HIV-1&-2 AB AG IA: CPT

## 2024-03-13 PROCEDURE — 80061 LIPID PANEL: CPT

## 2024-03-13 PROCEDURE — 83036 HEMOGLOBIN GLYCOSYLATED A1C: CPT

## 2024-03-14 LAB
HCV AB SERPL QL IA: NONREACTIVE
HIV 1+2 AB+HIV1 P24 AG SERPL QL IA: NONREACTIVE
T3FREE SERPL-MCNC: 2.6 PG/ML (ref 2–4.4)
VIT B12 SERPL-MCNC: 587 PG/ML (ref 232–1245)
VIT D+METAB SERPL-MCNC: 10 NG/ML (ref 20–50)

## 2024-03-15 LAB — ZINC SERPL-MCNC: 72.6 UG/DL

## 2024-05-08 ENCOUNTER — MEDICAL CORRESPONDENCE (OUTPATIENT)
Dept: HEALTH INFORMATION MANAGEMENT | Facility: CLINIC | Age: 29
End: 2024-05-08
Payer: COMMERCIAL

## 2024-05-10 ENCOUNTER — OFFICE VISIT (OUTPATIENT)
Dept: FAMILY MEDICINE | Facility: CLINIC | Age: 29
End: 2024-05-10
Payer: COMMERCIAL

## 2024-05-10 VITALS
HEIGHT: 73 IN | HEART RATE: 84 BPM | DIASTOLIC BLOOD PRESSURE: 71 MMHG | BODY MASS INDEX: 33 KG/M2 | SYSTOLIC BLOOD PRESSURE: 108 MMHG | WEIGHT: 249 LBS | TEMPERATURE: 98.4 F | OXYGEN SATURATION: 97 % | RESPIRATION RATE: 18 BRPM

## 2024-05-10 DIAGNOSIS — F33.1 MAJOR DEPRESSIVE DISORDER, RECURRENT EPISODE, MODERATE (H): ICD-10-CM

## 2024-05-10 DIAGNOSIS — F90.0 ADHD, PREDOMINANTLY INATTENTIVE TYPE: ICD-10-CM

## 2024-05-10 DIAGNOSIS — Z51.81 ENCOUNTER FOR THERAPEUTIC DRUG MONITORING: ICD-10-CM

## 2024-05-10 DIAGNOSIS — F41.1 GAD (GENERALIZED ANXIETY DISORDER): Primary | ICD-10-CM

## 2024-05-10 DIAGNOSIS — F64.0 TRANSSEXUALISM: ICD-10-CM

## 2024-05-10 LAB
ATRIAL RATE - MUSE: 70 BPM
DIASTOLIC BLOOD PRESSURE - MUSE: 71 MMHG
INTERPRETATION ECG - MUSE: NORMAL
P AXIS - MUSE: 61 DEGREES
PR INTERVAL - MUSE: 152 MS
QRS DURATION - MUSE: 108 MS
QT - MUSE: 400 MS
QTC - MUSE: 432 MS
R AXIS - MUSE: 13 DEGREES
SYSTOLIC BLOOD PRESSURE - MUSE: 108 MMHG
T AXIS - MUSE: 47 DEGREES
VENTRICULAR RATE- MUSE: 70 BPM

## 2024-05-10 PROCEDURE — 93010 ELECTROCARDIOGRAM REPORT: CPT | Performed by: INTERNAL MEDICINE

## 2024-05-10 PROCEDURE — 99214 OFFICE O/P EST MOD 30 MIN: CPT | Performed by: PHYSICIAN ASSISTANT

## 2024-05-10 PROCEDURE — 96127 BRIEF EMOTIONAL/BEHAV ASSMT: CPT | Performed by: PHYSICIAN ASSISTANT

## 2024-05-10 PROCEDURE — 93005 ELECTROCARDIOGRAM TRACING: CPT | Performed by: PHYSICIAN ASSISTANT

## 2024-05-10 RX ORDER — PROGESTERONE 100 MG/1
100 CAPSULE ORAL DAILY
COMMUNITY
Start: 2024-05-07

## 2024-05-10 ASSESSMENT — ANXIETY QUESTIONNAIRES
1. FEELING NERVOUS, ANXIOUS, OR ON EDGE: NEARLY EVERY DAY
8. IF YOU CHECKED OFF ANY PROBLEMS, HOW DIFFICULT HAVE THESE MADE IT FOR YOU TO DO YOUR WORK, TAKE CARE OF THINGS AT HOME, OR GET ALONG WITH OTHER PEOPLE?: VERY DIFFICULT
3. WORRYING TOO MUCH ABOUT DIFFERENT THINGS: NEARLY EVERY DAY
GAD7 TOTAL SCORE: 17
GAD7 TOTAL SCORE: 17
4. TROUBLE RELAXING: MORE THAN HALF THE DAYS
IF YOU CHECKED OFF ANY PROBLEMS ON THIS QUESTIONNAIRE, HOW DIFFICULT HAVE THESE PROBLEMS MADE IT FOR YOU TO DO YOUR WORK, TAKE CARE OF THINGS AT HOME, OR GET ALONG WITH OTHER PEOPLE: VERY DIFFICULT
6. BECOMING EASILY ANNOYED OR IRRITABLE: SEVERAL DAYS
7. FEELING AFRAID AS IF SOMETHING AWFUL MIGHT HAPPEN: MORE THAN HALF THE DAYS
2. NOT BEING ABLE TO STOP OR CONTROL WORRYING: NEARLY EVERY DAY
GAD7 TOTAL SCORE: 17
7. FEELING AFRAID AS IF SOMETHING AWFUL MIGHT HAPPEN: MORE THAN HALF THE DAYS
5. BEING SO RESTLESS THAT IT IS HARD TO SIT STILL: NEARLY EVERY DAY

## 2024-05-10 ASSESSMENT — PATIENT HEALTH QUESTIONNAIRE - PHQ9
10. IF YOU CHECKED OFF ANY PROBLEMS, HOW DIFFICULT HAVE THESE PROBLEMS MADE IT FOR YOU TO DO YOUR WORK, TAKE CARE OF THINGS AT HOME, OR GET ALONG WITH OTHER PEOPLE: VERY DIFFICULT
SUM OF ALL RESPONSES TO PHQ QUESTIONS 1-9: 20
SUM OF ALL RESPONSES TO PHQ QUESTIONS 1-9: 20

## 2024-05-10 NOTE — ASSESSMENT & PLAN NOTE
She is followed by psychiatry and [facility].  She is in need of ECG prior to starting stimulants.  Not currently on medication for ADHD.  She is currently on Lexapro and bupropion for depression and anxiety.    ECG was completed today.  Rate of 70.  No acute ST or T wave changes.  No prolonged QT.  Awaiting radiology overread at this time.  We will plan to fax this to [facility] at this time.

## 2024-05-10 NOTE — PROGRESS NOTES
Assessment & Plan   Problem List Items Addressed This Visit       Gender dysphoria in adolescent and adult     She is a transgender male to female.  She is currently on hormones and is followed with [facility] care and [facility].         Major depressive disorder, recurrent episode, moderate (H)     PHQ-9 score of 20.  JOSE ANTONIO-7 score of 17.  Currently on Lexapro 20 mg daily along with bupropion 300 mg daily.  She reports that she has had fleeting thoughts of suicide or self-harm.  No plan in place.  She states that these are fleeting thoughts.   She has a safety plan in place that was developed at [facility].  No change in medications at this time.  She will continue to follow with psychiatry.  He feels safe in her home.         JOSE ANTONIO (generalized anxiety disorder) - Primary    ADHD, predominantly inattentive type     She is followed by psychiatry and [facility].  She is in need of ECG prior to starting stimulants.  Not currently on medication for ADHD.  She is currently on Lexapro and bupropion for depression and anxiety.    ECG was completed today.  Rate of 70.  No acute ST or T wave changes.  No prolonged QT.  Awaiting radiology overread at this time.  We will plan to fax this to [facility] at this time.          Other Visit Diagnoses       Encounter for therapeutic drug monitoring        Relevant Orders    EKG 12-lead, tracing only (Completed)           Alvin Lundberg is a 28 year old, presenting for the following health issues:  Medication Therapy Management (Needs EKG per Dr. Wood for monitoring medication Lexapro/Buproprion)        5/10/2024     1:36 PM   Additional Questions   Roomed by clr   Accompanied by self         5/10/2024     1:36 PM   Patient Reported Additional Medications   Patient reports taking the following new medications none     Presents today in need of an ECG prior to starting ADHD medications.  She is followed by [facility].  She  "is currently on bupropion and Lexapro for depression.  She scored 20 on her PHQ-9 today.  She states that she does have thoughts of suicide.  She has no plan in place self-harm or suicide.  She has a safety plan in place that was developed by [facility].  She lives with her parents.  She reports that she feels safe.        History of Present Illness       Reason for visit:  Ekg    She eats 2-3 servings of fruits and vegetables daily.She consumes 2 sweetened beverage(s) daily.She exercises with enough effort to increase her heart rate 10 to 19 minutes per day.  She exercises with enough effort to increase her heart rate 3 or less days per week. She is missing 1 dose(s) of medications per week.        Objective    /71 (BP Location: Left arm, Patient Position: Sitting, Cuff Size: Adult Large)   Pulse 84   Temp 98.4  F (36.9  C) (Oral)   Resp 18   Ht 1.854 m (6' 1\")   Wt 112.9 kg (249 lb)   SpO2 97%   BMI 32.85 kg/m    Body mass index is 32.85 kg/m .  Physical Exam  Constitutional:       Appearance: Normal appearance.   Cardiovascular:      Pulses: Normal pulses.      Heart sounds: Normal heart sounds.   Pulmonary:      Breath sounds: Normal breath sounds.   Neurological:      Mental Status: She is alert.   Psychiatric:         Mood and Affect: Mood normal.         Behavior: Behavior normal.         Thought Content: Thought content normal.         Judgment: Judgment normal.         Signed Electronically by: Alayna Stein PA-C    Answers submitted by the patient for this visit:  Patient Health Questionnaire (Submitted on 5/10/2024)  If you checked off any problems, how difficult have these problems made it for you to do your work, take care of things at home, or get along with other people?: Very difficult  PHQ9 TOTAL SCORE: 20  JOSE ANTONIO-7 (Submitted on 5/10/2024)  JOSE ANTONIO 7 TOTAL SCORE: 17    "

## 2024-05-10 NOTE — ASSESSMENT & PLAN NOTE
PHQ-9 score of 20.  JOSE ANTONIO-7 score of 17.  Currently on Lexapro 20 mg daily along with bupropion 300 mg daily.  She reports that she has had fleeting thoughts of suicide or self-harm.  No plan in place.  She states that these are fleeting thoughts.   She has a safety plan in place that was developed at [facility] and [facility].  No change in medications at this time.  She will continue to follow with psychiatry.  He feels safe in her home.

## 2024-05-10 NOTE — ASSESSMENT & PLAN NOTE
She is a transgender male to female.  She is currently on hormones and is followed with [facility] care and [facility].

## 2024-06-04 DIAGNOSIS — F90.9 ATTENTION DEFICIT HYPERACTIVITY DISORDER: Primary | ICD-10-CM

## 2024-06-04 DIAGNOSIS — F41.1 GENERALIZED ANXIETY DISORDER: ICD-10-CM

## 2024-06-06 ENCOUNTER — OFFICE VISIT (OUTPATIENT)
Dept: FAMILY MEDICINE | Facility: CLINIC | Age: 29
End: 2024-06-06
Payer: COMMERCIAL

## 2024-06-06 VITALS
RESPIRATION RATE: 18 BRPM | BODY MASS INDEX: 32.85 KG/M2 | SYSTOLIC BLOOD PRESSURE: 104 MMHG | WEIGHT: 249 LBS | OXYGEN SATURATION: 98 % | HEART RATE: 96 BPM | TEMPERATURE: 99.4 F | DIASTOLIC BLOOD PRESSURE: 65 MMHG

## 2024-06-06 DIAGNOSIS — R07.0 THROAT PAIN: ICD-10-CM

## 2024-06-06 DIAGNOSIS — J06.9 VIRAL URI WITH COUGH: Primary | ICD-10-CM

## 2024-06-06 LAB
DEPRECATED S PYO AG THROAT QL EIA: NEGATIVE
GROUP A STREP BY PCR: NOT DETECTED
SARS-COV-2 RNA RESP QL NAA+PROBE: NEGATIVE

## 2024-06-06 PROCEDURE — 99213 OFFICE O/P EST LOW 20 MIN: CPT | Performed by: NURSE PRACTITIONER

## 2024-06-06 PROCEDURE — 87651 STREP A DNA AMP PROBE: CPT | Performed by: NURSE PRACTITIONER

## 2024-06-06 PROCEDURE — 87635 SARS-COV-2 COVID-19 AMP PRB: CPT | Performed by: NURSE PRACTITIONER

## 2024-06-06 ASSESSMENT — ENCOUNTER SYMPTOMS: SORE THROAT: 1

## 2024-06-06 NOTE — PROGRESS NOTES
Assessment & Plan     Throat pain    - Symptomatic COVID-19 Virus (Coronavirus) by PCR Nose  - Streptococcus A Rapid Screen w/Reflex to PCR - Clinic Collect  - Group A Streptococcus PCR Throat Swab    Viral URI with cough         Focused exam and history done due to COVID-19 pandemic in a walk-in setting.      Throat pain with marked erythema on exam, equal tonsils size and minimal cough for the last 2 days.  Negative strep test today.    History, exam, and vital signs consistent with a viral URI.    Recheck if shortness of breath, throat pain is significantly worsening, throat pain is still quite severe after total of 7 days and/or fevers persist longer than 1 week.  Rest.     OTCs recommended: None [   ].  Dextromethorphan  [  ], guaifenesin [  ], pseudoephedrine [   ], Afrin for no greater than 3 days [  ], Tylenol or ibuprofen [ x ].  + Cepacol, salt water gargles.              Return in about 4 days (around 6/10/2024) for If no better.    Selena Cash, [facility] is a 28 year old adult who presents to clinic today for the following health issues:  Chief Complaint   Patient presents with    Pharyngitis     Since Monday night- throat pain, covid negative yesterday, cough, fever     HPI    Mostly ST with mild cough starting 2-3 days ago.  Pain in throat 7/10 without ibuprofen.    Fatigue.  Fever at onset 2 days ago.  Has not checked since.    Ibuprofen every 4-6 hours.       Review of Systems   HENT:  Positive for ear pain and sore throat. Negative for congestion.                Objective    /65 (BP Location: Right arm, Patient Position: Sitting, Cuff Size: Adult Regular)   Pulse 96   Temp 99.4  F (37.4  C) (Oral)   Resp 18   Wt 112.9 kg (249 lb)   SpO2 98%   BMI 32.85 kg/m    Physical Exam  Constitutional:       General: She is not in acute distress.     Appearance: She is well-developed.   HENT:      Right Ear: Tympanic membrane normal.      Left  Ear: Tympanic membrane normal.      Mouth/Throat:      Pharynx: Posterior oropharyngeal erythema present.      Tonsils: No tonsillar exudate or tonsillar abscesses. 2+ on the right. 2+ on the left.   Eyes:      General:         Right eye: No discharge.         Left eye: No discharge.      Conjunctiva/sclera: Conjunctivae normal.   Pulmonary:      Effort: Pulmonary effort is normal.   Musculoskeletal:         General: Normal range of motion.   Skin:     General: Skin is warm and dry.      Capillary Refill: Capillary refill takes less than 2 seconds.   Neurological:      Mental Status: She is alert and oriented to person, place, and time.   Psychiatric:         Mood and Affect: Mood normal.         Behavior: Behavior normal.         Thought Content: Thought content normal.         Judgment: Judgment normal.            Results for orders placed or performed in visit on 06/06/24 (from the past 24 hour(s))   Streptococcus A Rapid Screen w/Reflex to PCR - Clinic Collect    Specimen: Throat; Swab   Result Value Ref Range    Group A Strep antigen Negative Negative

## 2024-06-06 NOTE — LETTER
June 6, 2024      Edgard Lopez  [address] [facility] [id number]        To Whom It May Concern:    Edgard Lopez  was seen on June 6.  Please excuse her  until June 8 and until fever free for 24 hours due to illness.        Sincerely,        Selena Cash, CNP

## 2024-06-06 NOTE — PATIENT INSTRUCTIONS
The rapid strep test is negative today.  We do 2 tests for strep.  We will call if the backup strep culture is positive later today and start antibiotics.      Treat like a virus for now-Tylenol, ibuprofen, Cepacol lozenges, salt water gargles.  See handout for more details.  Come back if you still have a significant sore throat in 1 week, sooner if much worse or fevers persist over 100.4 for longer than 1 week.     Watch MyChart for your COVID test result.

## 2024-07-17 ENCOUNTER — OFFICE VISIT (OUTPATIENT)
Dept: FAMILY MEDICINE | Facility: CLINIC | Age: 29
End: 2024-07-17
Payer: MEDICAID

## 2024-07-17 VITALS
DIASTOLIC BLOOD PRESSURE: 69 MMHG | SYSTOLIC BLOOD PRESSURE: 113 MMHG | OXYGEN SATURATION: 99 % | HEART RATE: 82 BPM | TEMPERATURE: 98.4 F

## 2024-07-17 DIAGNOSIS — J03.90 TONSILLITIS: Primary | ICD-10-CM

## 2024-07-17 DIAGNOSIS — R07.0 THROAT PAIN: ICD-10-CM

## 2024-07-17 LAB
BASOPHILS # BLD AUTO: 0 10E3/UL (ref 0–0.2)
BASOPHILS NFR BLD AUTO: 0 %
DEPRECATED S PYO AG THROAT QL EIA: NEGATIVE
EOSINOPHIL # BLD AUTO: 0.2 10E3/UL (ref 0–0.7)
EOSINOPHIL NFR BLD AUTO: 2 %
ERYTHROCYTE [DISTWIDTH] IN BLOOD BY AUTOMATED COUNT: 11.8 % (ref 10–15)
GROUP A STREP BY PCR: NOT DETECTED
HCT VFR BLD AUTO: 36.1 % (ref 35–53)
HGB BLD-MCNC: 12.8 G/DL (ref 11.7–17.7)
IMM GRANULOCYTES # BLD: 0 10E3/UL
IMM GRANULOCYTES NFR BLD: 0 %
LYMPHOCYTES # BLD AUTO: 2.6 10E3/UL (ref 0.8–5.3)
LYMPHOCYTES NFR BLD AUTO: 22 %
MCH RBC QN AUTO: 32.8 PG (ref 26.5–33)
MCHC RBC AUTO-ENTMCNC: 35.5 G/DL (ref 31.5–36.5)
MCV RBC AUTO: 93 FL (ref 78–100)
MONOCYTES # BLD AUTO: 0.6 10E3/UL (ref 0–1.3)
MONOCYTES NFR BLD AUTO: 5 %
MONOCYTES NFR BLD AUTO: NEGATIVE %
NEUTROPHILS # BLD AUTO: 8.3 10E3/UL (ref 1.6–8.3)
NEUTROPHILS NFR BLD AUTO: 71 %
PLATELET # BLD AUTO: 272 10E3/UL (ref 150–450)
RBC # BLD AUTO: 3.9 10E6/UL (ref 3.8–5.9)
WBC # BLD AUTO: 11.7 10E3/UL (ref 4–11)

## 2024-07-17 PROCEDURE — 36415 COLL VENOUS BLD VENIPUNCTURE: CPT | Performed by: PHYSICIAN ASSISTANT

## 2024-07-17 PROCEDURE — 99214 OFFICE O/P EST MOD 30 MIN: CPT | Performed by: PHYSICIAN ASSISTANT

## 2024-07-17 PROCEDURE — 85025 COMPLETE CBC W/AUTO DIFF WBC: CPT | Performed by: PHYSICIAN ASSISTANT

## 2024-07-17 PROCEDURE — 87651 STREP A DNA AMP PROBE: CPT | Performed by: PHYSICIAN ASSISTANT

## 2024-07-17 PROCEDURE — 86308 HETEROPHILE ANTIBODY SCREEN: CPT | Performed by: PHYSICIAN ASSISTANT

## 2024-07-17 RX ORDER — PENICILLIN V POTASSIUM 500 MG/1
500 TABLET, FILM COATED ORAL 2 TIMES DAILY
Qty: 20 TABLET | Refills: 0 | Status: SHIPPED | OUTPATIENT
Start: 2024-07-17 | End: 2024-07-27

## 2024-07-17 RX ORDER — PREDNISONE 20 MG/1
40 TABLET ORAL DAILY
Qty: 10 TABLET | Refills: 0 | Status: SHIPPED | OUTPATIENT
Start: 2024-07-17 | End: 2024-07-22

## 2024-07-17 NOTE — PATIENT INSTRUCTIONS
Screening test for mononucleosis was negative    You are treated for tonsillitis with an antibiotic and anti-inflammatory medicine    Suggested increased rest increased fluids and bedside humidification  Over-the-counter Tylenol for comfort.  Follow packaging directions  Over-the-counter throat lozenges with benzocaine, such as Cepacol, may be used if indicated and is not a choking hazard based on age.    Follow packaging directions.    Do not overuse the benzocaine as it will dry the throat and make it uncomfortable.  May use the throat lozenges one lozenge per hour.  May use hard candies or lemon drops etc. to keep the saliva flowing for comfort until the next hour interval dosing for the lozenge  Noncontagious after 24 hours on the antibiotic.  Change toothbrush out after 48 hours to avoid reinfecting the mouth.  Follow-up after completion of the antibiotic if not completely resolved with symptoms or new symptoms or concerns arise.

## 2024-07-17 NOTE — PROGRESS NOTES
Patient presents with:  Urgent Care: - Throat Pain  - Came in June for similar symptoms  - Throat still feels inflamed  - Has pressure in ear canal (Bilateral)  - Ibuprofen for symptoms      Clinical Decision Making:  Strep test was obtained and was negative.  Culture is to follow. Mono spot is negative.  CBC screening test was within normal limits with the exception of mildly elevated white blood cell count.  Patient is treated with Pen-Vee K for 10 days for tonsillitis.  Use of prednisone to help with the inflammation and swelling since the tonsils were markedly swollen.  Symptomatic care was gone over. Expected course of resolution and indication for return was gone over and questions were answered to patient/parent's satisfaction before discharge.        ICD-10-CM    1. Tonsillitis  J03.90 CBC with platelets and differential     Mononucleosis screen     penicillin V (VEETID) 500 MG tablet     predniSONE (DELTASONE) 20 MG tablet      2. Throat pain  R07.0 Streptococcus A Rapid Screen w/Reflex to PCR - Clinic Collect     Group A Streptococcus PCR Throat Swab          Patient Instructions   Screening test for mononucleosis was negative    You are treated for tonsillitis with an antibiotic and anti-inflammatory medicine    Suggested increased rest increased fluids and bedside humidification  Over-the-counter Tylenol for comfort.  Follow packaging directions  Over-the-counter throat lozenges with benzocaine, such as Cepacol, may be used if indicated and is not a choking hazard based on age.    Follow packaging directions.    Do not overuse the benzocaine as it will dry the throat and make it uncomfortable.  May use the throat lozenges one lozenge per hour.  May use hard candies or lemon drops etc. to keep the saliva flowing for comfort until the next hour interval dosing for the lozenge  Noncontagious after 24 hours on the antibiotic.  Change toothbrush out after 48 hours to avoid reinfecting the mouth.  Follow-up  after completion of the antibiotic if not completely resolved with symptoms or new symptoms or concerns arise.     HPI:  Edgard Lopez is a 28 year old adult who presents today for a 5-day continuing history of sore throat odynophagia and tonsillar swelling.  Patient was seen in June and had strep test and culture which was returned as negative and a COVID test which was negative.  Patient was treated with symptomatic care and continued to have sore throat and odynophagia.  Concern is now that he has had enlarged tonsils, bilateral ear pain that is made worse with swallowing.  No reported cough, fever chills night sweats vomiting diarrhea skin rash abdominal pain or urinary symptoms.  No treatment was tried for this at home.    History obtained from chart review and the patient.    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, predominantly inattentive type  2018-03: JOSE ANTONIO (generalized anxiety disorder)  2018-03: Major depressive disorder, recurrent episode, moderate (H)  2018-02: Gender dysphoria in adolescent and adult  2018-02: Major depressive disorder without psychotic features  2018-02: Other specified anxiety disorders  2018-02: Marijuana abuse  Bipolar Disorder      Past Medical History:   Diagnosis Date    Anxiety     Depression     Depressive disorder        Social History     Tobacco Use    Smoking status: Former     Current packs/day: 0.50     Types: Cigarettes, Cigars, cigarillos or filtered cigars     Passive exposure: Never    Smokeless tobacco: Never   Substance Use Topics    Alcohol use: Yes     Comment: Alcoholic Drinks/day: ocassional       Review of Systems  As above in HPI otherwise negative.    Vitals:    07/17/24 1029   BP: 113/69   Pulse: 82   Temp: 98.4  F (36.9  C)   TempSrc: Oral   SpO2: 99%       General: Patient is resting comfortably no acute distress is afebrile  HEENT: Head is normocephalic atraumatic   eyes are PERRL EOMI  "sclera anicteric   TMs are clear bilaterally  Throat is with 3+ tonsils with white exudate uvula is midline soft palate is not deviated  Positive cervical lymphadenopathy and tenderness to palpation   Neck is otherwise supple  LUNGS: Clear to auscultation bilaterally  HEART: Regular rate and rhythm  Skin: Without rash non-diaphoretic    Physical Exam      Labs:  Results for orders placed or performed in visit on 07/17/24   Mononucleosis screen     Status: Normal   Result Value Ref Range    Mononucleosis Screen Negative Negative   CBC with platelets and differential     Status: Abnormal   Result Value Ref Range    WBC Count 11.7 (H) 4.0 - 11.0 10e3/uL    RBC Count 3.90 3.80 - 5.90 10e6/uL    Hemoglobin 12.8 11.7 - 17.7 g/dL    Hematocrit 36.1 35.0 - 53.0 %    MCV 93 78 - 100 fL    MCH 32.8 26.5 - 33.0 pg    MCHC 35.5 31.5 - 36.5 g/dL    RDW 11.8 10.0 - 15.0 %    Platelet Count 272 150 - 450 10e3/uL    % Neutrophils 71 %    % Lymphocytes 22 %    % Monocytes 5 %    % Eosinophils 2 %    % Basophils 0 %    % Immature Granulocytes 0 %    Absolute Neutrophils 8.3 1.6 - 8.3 10e3/uL    Absolute Lymphocytes 2.6 0.8 - 5.3 10e3/uL    Absolute Monocytes 0.6 0.0 - 1.3 10e3/uL    Absolute Eosinophils 0.2 0.0 - 0.7 10e3/uL    Absolute Basophils 0.0 0.0 - 0.2 10e3/uL    Absolute Immature Granulocytes 0.0 <=0.4 10e3/uL    Narrative    The generation of reference intervals for this test is currently based on binary male or female sex. If the electronic health record information indicates another gender identity or if Legal Sex is recorded as \"Unknown\", both male and female reference intervals are provided where applicable, and should be considered according to the individual's appropriate clinical context.   Streptococcus A Rapid Screen w/Reflex to PCR - Clinic Collect     Status: Normal    Specimen: Throat; Swab   Result Value Ref Range    Group A Strep antigen Negative Negative   CBC with platelets and differential     Status: " Abnormal    Narrative    The following orders were created for panel order CBC with platelets and differential.  Procedure                               Abnormality         Status                     ---------                               -----------         ------                     CBC with platelets and d...[[id number]]  Abnormal            Final result                 Please view results for these tests on the individual orders.       At the end of the encounter, I discussed results, diagnosis, medications. Discussed red flags for immediate return to clinic/ER, as well as indications for follow up if no improvement. Patient understood and agreed to plan. Patient was stable for discharge.

## 2025-01-10 ENCOUNTER — APPOINTMENT (OUTPATIENT)
Dept: RADIOLOGY | Facility: HOSPITAL | Age: 30
End: 2025-01-10
Attending: STUDENT IN AN ORGANIZED HEALTH CARE EDUCATION/TRAINING PROGRAM

## 2025-01-10 ENCOUNTER — HOSPITAL ENCOUNTER (EMERGENCY)
Facility: HOSPITAL | Age: 30
Discharge: HOME | End: 2025-01-10
Attending: STUDENT IN AN ORGANIZED HEALTH CARE EDUCATION/TRAINING PROGRAM | Admitting: STUDENT IN AN ORGANIZED HEALTH CARE EDUCATION/TRAINING PROGRAM

## 2025-01-10 VITALS
DIASTOLIC BLOOD PRESSURE: 65 MMHG | OXYGEN SATURATION: 92 % | RESPIRATION RATE: 13 BRPM | TEMPERATURE: 97.2 F | BODY MASS INDEX: 33.62 KG/M2 | HEIGHT: 74 IN | WEIGHT: 262 LBS | SYSTOLIC BLOOD PRESSURE: 115 MMHG | HEART RATE: 70 BPM

## 2025-01-10 DIAGNOSIS — S43.005A SHOULDER DISLOCATION, LEFT, INITIAL ENCOUNTER: ICD-10-CM

## 2025-01-10 PROCEDURE — 96375 TX/PRO/DX INJ NEW DRUG ADDON: CPT

## 2025-01-10 PROCEDURE — 23650 CLTX SHO DSLC W/MNPJ WO ANES: CPT | Mod: LT

## 2025-01-10 PROCEDURE — 96374 THER/PROPH/DIAG INJ IV PUSH: CPT

## 2025-01-10 PROCEDURE — 999N000065 XR SHOULDER LEFT PORT G/E 2 VIEWS: Mod: LT

## 2025-01-10 PROCEDURE — 250N000011 HC RX IP 250 OP 636: Performed by: STUDENT IN AN ORGANIZED HEALTH CARE EDUCATION/TRAINING PROGRAM

## 2025-01-10 PROCEDURE — 99285 EMERGENCY DEPT VISIT HI MDM: CPT | Mod: 25

## 2025-01-10 PROCEDURE — 250N000009 HC RX 250: Mod: JW | Performed by: STUDENT IN AN ORGANIZED HEALTH CARE EDUCATION/TRAINING PROGRAM

## 2025-01-10 PROCEDURE — 73030 X-RAY EXAM OF SHOULDER: CPT | Mod: LT

## 2025-01-10 RX ORDER — PROPOFOL 10 MG/ML
60 INJECTION, EMULSION INTRAVENOUS ONCE
Status: COMPLETED | OUTPATIENT
Start: 2025-01-10 | End: 2025-01-10

## 2025-01-10 RX ORDER — OXYCODONE HYDROCHLORIDE 5 MG/1
5 TABLET ORAL EVERY 6 HOURS PRN
Qty: 12 TABLET | Refills: 0 | Status: SHIPPED | OUTPATIENT
Start: 2025-01-10 | End: 2025-01-13

## 2025-01-10 RX ORDER — ONDANSETRON 2 MG/ML
4 INJECTION INTRAMUSCULAR; INTRAVENOUS ONCE
Status: COMPLETED | OUTPATIENT
Start: 2025-01-10 | End: 2025-01-10

## 2025-01-10 RX ORDER — FENTANYL CITRATE 50 UG/ML
100 INJECTION, SOLUTION INTRAMUSCULAR; INTRAVENOUS ONCE
Status: COMPLETED | OUTPATIENT
Start: 2025-01-10 | End: 2025-01-10

## 2025-01-10 RX ADMIN — PROPOFOL 60 MG: 10 INJECTION, EMULSION INTRAVENOUS at 04:07

## 2025-01-10 RX ADMIN — ONDANSETRON 4 MG: 2 INJECTION INTRAMUSCULAR; INTRAVENOUS at 04:01

## 2025-01-10 RX ADMIN — FENTANYL CITRATE 100 MCG: 50 INJECTION, SOLUTION INTRAMUSCULAR; INTRAVENOUS at 03:31

## 2025-01-10 RX ADMIN — Medication 60 MG: at 04:07

## 2025-01-10 RX ADMIN — HYDROMORPHONE HYDROCHLORIDE 1 MG: 1 INJECTION, SOLUTION INTRAMUSCULAR; INTRAVENOUS; SUBCUTANEOUS at 04:00

## 2025-01-10 RX ADMIN — FENTANYL CITRATE 100 MCG: 50 INJECTION, SOLUTION INTRAMUSCULAR; INTRAVENOUS at 03:12

## 2025-01-10 ASSESSMENT — ACTIVITIES OF DAILY LIVING (ADL)
ADLS_ACUITY_SCORE: 41

## 2025-01-10 ASSESSMENT — COLUMBIA-SUICIDE SEVERITY RATING SCALE - C-SSRS
2. HAVE YOU ACTUALLY HAD ANY THOUGHTS OF KILLING YOURSELF IN THE PAST MONTH?: NO
6. HAVE YOU EVER DONE ANYTHING, STARTED TO DO ANYTHING, OR PREPARED TO DO ANYTHING TO END YOUR LIFE?: YES
1. IN THE PAST MONTH, HAVE YOU WISHED YOU WERE DEAD OR WISHED YOU COULD GO TO SLEEP AND NOT WAKE UP?: NO

## 2025-01-10 NOTE — DISCHARGE INSTRUCTIONS
Use 1000mg tylenol and 600mg ibuprofen every 6 hours to help with pain. If these are still not sufficient, then you may use the prescribed oxycodone as directed for breakthrough pain.    Keep the immobilizer on except when showering (but keep the arm firm against the chest/abdomen to prevent recurrent dislocation). This stays on until your orthopedic appointment.    Call [facility] at the number above to set up your follow up appointment.    Please return to the ER if your symptoms worsen, if you develop new numbness or weakness.

## 2025-01-10 NOTE — Clinical Note
Edgard Lopez was seen and treated in our emergency department on 1/10/2025.  She may return to work on 01/12/2025.       If you have any questions or concerns, please don't hesitate to call.      Ruben Goldman MD

## 2025-01-10 NOTE — ED PROVIDER NOTES
EMERGENCY DEPARTMENT ENCOUNTER      NAME: Edgard Lopez  AGE: 29 year old adult  YOB: 1995  MRN: [id number]  EVALUATION DATE & TIME: No admission date for patient encounter.    PCP: Priscilla Dorado    ED PROVIDER: Ruben Goldman MD      Chief Complaint   Patient presents with    Dislocation     Pt left shoulder/arm dislocated         FINAL IMPRESSION:  1. Shoulder dislocation, left, initial encounter          ED COURSE & MEDICAL DECISION MAKING:    Pertinent Labs & Imaging studies reviewed. (See chart for details)  29 year old adult presents to the Emergency Department for evaluation of L shoulder pain    ED Course as of 01/10/25 0525   Fri Kenneth 10, 2025   0313 Pt is a 30yo M to F transgender patient, previously healthy, who has had recurrent L shoulder dislocations (including while sleeping), presents with L shoulder dislocation while sleeping tonight. Shoulder appears dislocated on exam, and other than subjective tingling on outside of shoulder, otherwise intact neuro and vascular exam distally. Plan for plain film, pain meds, reduction, post-reduction film.   0442 Initial XR showed anterior dislocation without fracture. Sedation/reduction performed as documented below, immobilizer placed, and post-reduction film showed successful reduction without fracture.   0521 Pt now fully alert and awake, tolerating PO, ambulating without difficulty, significant reduction in pain, and no longer feeling the tingling sensation. Pt discharged with return precautions, Rx for oxycodone for breakthrough pain, and follow up with [facility].       0300 I introduced myself to the patient, obtained patient history, performed a physical exam, and discussed plan for ED workup including potential diagnostic laboratory/imaging studies and interventions.     Medical Decision Making  Obtained supplemental history:Supplemental history obtained?: Documented in chart and Family Member/Significant  Other  Reviewed external records: External records reviewed?: No  Care impacted by chronic illness:Alcohol and/or Drug Abuse or Dependence, Hyperlipidemia, Mental Health, and Smoking / Nicotine Use  Care significantly affected by social determinants of health:Access to Medical Care  Did you consider but not order tests?: Work up considered but not performed and documented in chart, if applicable  Did you interpret images independently?: Independent interpretation of ECG and images noted in documentation, when applicable.  Consultation discussion with other provider:Did you involve another provider (consultant, , pharmacy, etc.)?: No  Discharge. I prescribed additional prescription strength medication(s) as charted. See documentation for any additional details.  Not Applicable      At the conclusion of the encounter I discussed the results of all of the tests and the disposition. The questions were answered. The patient or family acknowledged understanding and was agreeable with the care plan.     0 minutes of critical care time     MEDICATIONS GIVEN IN THE EMERGENCY:  Medications   fentaNYL (PF) (SUBLIMAZE) injection 100 mcg (100 mcg Intravenous $Given 1/10/25 0312)   fentaNYL (PF) (SUBLIMAZE) injection 100 mcg (100 mcg Intravenous $Given 1/10/25 0331)   HYDROmorphone (DILAUDID) injection 1 mg (1 mg Intravenous $Given 1/10/25 0400)   propofol (DIPRIVAN) injection 10 mg/mL vial (60 mg Intravenous $Given 1/10/25 0407)   ketamine (KETALAR) injection 60 mg (60 mg Intravenous $Given 1/10/25 0407)   ondansetron (ZOFRAN) injection 4 mg (4 mg Intravenous $Given 1/10/25 0401)       NEW PRESCRIPTIONS STARTED AT TODAY'S ER VISIT  New Prescriptions    OXYCODONE (ROXICODONE) 5 MG TABLET    Take 1 tablet (5 mg) by mouth every 6 hours as needed for breakthrough pain.          =================================================================    HPI    Patient information was obtained from: patient, girlfriend    Use of  ": N/A      Edgard Lopez is a 29 year old adult with a pertinent history of nicotine dependence, marijuana abuse, obesity, ADHD, gender dysphoria, and depression who presents to this ED via walk-in for evaluation of dislocation.     Pronouns are she/her/hers. Has had recurrent L shoulder dislocations before. She went to sleep in usual state of health and woke up in pain. This has happened during sleep before. No identified trauma otherwise. Having tingling on outside of L shoulder and upper arm. No meds PTA.      PAST MEDICAL HISTORY:  Past Medical History:   Diagnosis Date    Anxiety     Depression     Depressive disorder        PAST SURGICAL HISTORY:  Past Surgical History:   Procedure Laterality Date    wisdom teeth      WISDOM TOOTH EXTRACTION             CURRENT MEDICATIONS:    oxyCODONE (ROXICODONE) 5 MG tablet  B-D SYRINGE LUER-DIEGO 1 ML MISC  BD HYPODERMIC NEEDLE 18G X 1\" MISC  BD HYPODERMIC NEEDLE 25G X 1-1/2\" MISC  buPROPion (WELLBUTRIN XL) 300 MG 24 hr tablet  escitalopram (LEXAPRO) 20 MG tablet  estradiol valerate (DELESTROGEN) 20 MG/ML injection  progesterone (PROMETRIUM) 100 MG capsule        ALLERGIES:  No Known Allergies    FAMILY HISTORY:  Family History   Adopted: Yes       SOCIAL HISTORY:   Social History     Socioeconomic History    Marital status: Single   Tobacco Use    Smoking status: Former     Current packs/day: 0.50     Types: Cigarettes, Cigars, cigarillos or filtered cigars     Passive exposure: Never    Smokeless tobacco: Never   Vaping Use    Vaping status: Every Day    Substances: Nicotine    Devices: Refillable tank   Substance and Sexual Activity    Alcohol use: Yes     Comment: Alcoholic Drinks/day: ocassional    Drug use: No     Types: Marijuana     Social Drivers of Health     Financial Resource Strain: Low Risk  (12/18/2023)    Financial Resource Strain     Within the past 12 months, have you or your family members you live with been unable to get utilities (heat, " "electricity) when it was really needed?: No   Food Insecurity: Low Risk  (12/18/2023)    Food Insecurity     Within the past 12 months, did you worry that your food would run out before you got money to buy more?: No     Within the past 12 months, did the food you bought just not last and you didn t have money to get more?: No   Transportation Needs: Low Risk  (12/18/2023)    Transportation Needs     Within the past 12 months, has lack of transportation kept you from medical appointments, getting your medicines, non-medical meetings or appointments, work, or from getting things that you need?: No   Interpersonal Safety: Low Risk  (12/18/2023)    Interpersonal Safety     Do you feel physically and emotionally safe where you currently live?: Yes     Within the past 12 months, have you been hit, slapped, kicked or otherwise physically hurt by someone?: No     Within the past 12 months, have you been humiliated or emotionally abused in other ways by your partner or ex-partner?: No   Housing Stability: Low Risk  (12/18/2023)    Housing Stability     Do you have housing? : Yes     Are you worried about losing your housing?: No       VITALS:  /67   Pulse 72   Temp 97.2  F (36.2  C) (Oral)   Resp 13   Ht 1.88 m (6' 2\")   Wt 118.8 kg (262 lb)   SpO2 96%   BMI 33.64 kg/m      PHYSICAL EXAM    Physical Exam  Vitals and nursing note reviewed.   Constitutional:       General: She is not in acute distress.     Appearance: Normal appearance. She is obese. She is not ill-appearing.   HENT:      Head: Normocephalic and atraumatic.      Nose: Nose normal.      Mouth/Throat:      Mouth: Mucous membranes are moist.   Eyes:      Conjunctiva/sclera: Conjunctivae normal.   Cardiovascular:      Rate and Rhythm: Tachycardia present.      Pulses: Normal pulses.   Pulmonary:      Effort: Pulmonary effort is normal.   Abdominal:      General: Abdomen is flat.   Musculoskeletal:      Cervical back: Normal range of motion.      " Comments: Indentation of L shoulder joint. No overlying skin changes. Normal distal pulses, strength, sensation.   Skin:     General: Skin is warm and dry.      Capillary Refill: Capillary refill takes less than 2 seconds.   Neurological:      General: No focal deficit present.      Mental Status: She is alert and oriented to person, place, and time. Mental status is at baseline.   Psychiatric:         Mood and Affect: Mood normal.         Behavior: Behavior normal.         Thought Content: Thought content normal.         Judgment: Judgment normal.            LAB:  All pertinent labs reviewed and interpreted.  Results for orders placed or performed during the hospital encounter of 01/10/25   XR Shoulder Left G/E 3 Views    Impression    IMPRESSION: The left humeral head is anteriorly and inferiorly dislocated from the glenoid. No acute fracture visualized. Acromioclavicular joint is intact.   Shoulder XR, left  2-3 vw PORTABLE    Impression    IMPRESSION: Status post reduction of previously noted left shoulder dislocation. No definite evidence of fracture.       RADIOLOGY:  Reviewed all pertinent imaging. Please see official radiology report.  Shoulder XR, left  2-3 vw PORTABLE   Final Result   IMPRESSION: Status post reduction of previously noted left shoulder dislocation. No definite evidence of fracture.      XR Shoulder Left G/E 3 Views   Final Result   IMPRESSION: The left humeral head is anteriorly and inferiorly dislocated from the glenoid. No acute fracture visualized. Acromioclavicular joint is intact.          PROCEDURES:     PROCEDURE: Procedural Sedation  Orthopedic Injury Reduction   INDICATIONS: Sedation is required to allow for joint reduction (L shoulder)   SEDATION PROVIDER: Dr Michael Dawn   PROCEDURE PROVIDER: Dr Ruben Goldman   LEVEL OF SEDATION: Deep Sedation    Defined as:  Minimal = Normal response to verbal  Moderate = Responds to verbal and light tactile stimulation  Deep = Responds after  repeated painful stimulation   CONSENT: Risks, benefits and alternatives were discussed with and Verbal consent was obtained from Patient.   PROCEDURE SPECIFIC CHECKLIST COMPLETED:   Yes   LAST ORAL INTAKE: Light Meal > 6 hours   ASA CLASS: 2 - Mild systemic disease   MALLAMPATI:  I - Faucial pillars, soft palate, and uvula are visible   TIME OUT: Universal protocol was followed. TIME OUT conducted just prior to starting procedure confirmed patient identity, site/side, procedure, patient position, and availability of correct equipment. Yes    Immediately prior to initiation of sedation, reassessment of clinical condition was performed which was unchanged.   MEDICATIONS: Ketamine, 60 mg, IV and Propofol, 60 mg, IV   MONITORING: Monitoring consisted of:   heart rate, cardiac monitor, continuous pulse oximeter, continuous capnometry (end tidal CO2), frequent blood pressure checks, level of consciousness checks, IV access, constant attendance by RN until patient is recovered, and constant attendance by MD until patient is stable   RESPONSE: vital signs stable, airway patent, and O2 saturations remained >92%   REDUCTION   PROCEDURE DESCRIPTION: ORTHOPEDIC MANAGEMENT:  Bone/Joint Manipulation: Affected extremity was grasped and gradual traction was applied and was further manipulated manually until alignment and positioning were improved.     POST-SEDATION ASSESSMENT/NOTE: Lowest level oxygen saturation reached was 98%.    Post procedure patient was alert and responds to verbal stimuli    Patient was monitored during recovery and returned to pre-procedure baseline.   ADDITIONAL MD ASSISTANCE: None   TOTAL MD DRUG ADMINISTRATION / MONITORING TIME: 10 minutes   COMPLICATIONS:  Patient tolerated procedure well, without complication           [employer] Documentation:   CMS Diagnoses:              Citlali ALEJANDRE, ling serving as a scribe to document services personally performed by Ruben Goldman MD  based on my observation and the provider's statements to me. I, Ruben Goldman MD, attest that Citlali Weir is acting in a scribe capacity, has observed my performance of the services and has documented them in accordance with my direction.    Ruben Goldman MD  [facility] EMERGENCY DEPARTMENT  [address] [place] [id number]  [phone]       Ruben Goldman MD  01/10/25 0545

## 2025-01-10 NOTE — ED TRIAGE NOTES
Pt left shoulder/arm dislocated     Triage Assessment (Adult)       Row Name 01/10/25 0254          Triage Assessment    Airway WDL WDL        Respiratory WDL    Respiratory WDL WDL        Skin Circulation/Temperature WDL    Skin Circulation/Temperature WDL WDL        Cardiac WDL    Cardiac WDL X;rhythm     Pulse Rate & Regularity tachycardic        Peripheral/Neurovascular WDL    Peripheral Neurovascular WDL WDL        Cognitive/Neuro/Behavioral WDL    Cognitive/Neuro/Behavioral WDL WDL

## 2025-02-09 ENCOUNTER — HEALTH MAINTENANCE LETTER (OUTPATIENT)
Age: 30
End: 2025-02-09